# Patient Record
Sex: FEMALE | NOT HISPANIC OR LATINO | Employment: FULL TIME | URBAN - METROPOLITAN AREA
[De-identification: names, ages, dates, MRNs, and addresses within clinical notes are randomized per-mention and may not be internally consistent; named-entity substitution may affect disease eponyms.]

---
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
## 2020-02-05 ENCOUNTER — APPOINTMENT (OUTPATIENT)
Dept: RADIOLOGY | Facility: MEDICAL CENTER | Age: 20
DRG: 690 | End: 2020-02-05
Attending: EMERGENCY MEDICINE
Payer: COMMERCIAL

## 2020-02-05 ENCOUNTER — HOSPITAL ENCOUNTER (INPATIENT)
Facility: MEDICAL CENTER | Age: 20
LOS: 1 days | DRG: 690 | End: 2020-02-09
Attending: EMERGENCY MEDICINE | Admitting: HOSPITALIST
Payer: COMMERCIAL

## 2020-02-05 DIAGNOSIS — R11.2 INTRACTABLE VOMITING WITH NAUSEA, UNSPECIFIED VOMITING TYPE: ICD-10-CM

## 2020-02-05 DIAGNOSIS — N12 PYELONEPHRITIS: ICD-10-CM

## 2020-02-05 PROBLEM — D72.829 LEUKOCYTOSIS: Status: ACTIVE | Noted: 2020-02-05

## 2020-02-05 PROBLEM — N39.0 UTI (URINARY TRACT INFECTION): Status: ACTIVE | Noted: 2020-02-05

## 2020-02-05 PROBLEM — R73.9 HYPERGLYCEMIA: Status: ACTIVE | Noted: 2020-02-05

## 2020-02-05 LAB
ALBUMIN SERPL BCP-MCNC: 4.5 G/DL (ref 3.2–4.9)
ALBUMIN/GLOB SERPL: 1.1 G/DL
ALP SERPL-CCNC: 71 U/L (ref 30–99)
ALT SERPL-CCNC: 15 U/L (ref 2–50)
ANION GAP SERPL CALC-SCNC: 12 MMOL/L (ref 0–11.9)
APPEARANCE UR: ABNORMAL
AST SERPL-CCNC: 16 U/L (ref 12–45)
BACTERIA #/AREA URNS HPF: ABNORMAL /HPF
BASOPHILS # BLD AUTO: 0.4 % (ref 0–1.8)
BASOPHILS # BLD: 0.08 K/UL (ref 0–0.12)
BILIRUB SERPL-MCNC: 0.4 MG/DL (ref 0.1–1.5)
BILIRUB UR QL STRIP.AUTO: NEGATIVE
BUN SERPL-MCNC: 13 MG/DL (ref 8–22)
CALCIUM SERPL-MCNC: 9.9 MG/DL (ref 8.5–10.5)
CHLORIDE SERPL-SCNC: 104 MMOL/L (ref 96–112)
CO2 SERPL-SCNC: 21 MMOL/L (ref 20–33)
COLOR UR: YELLOW
CREAT SERPL-MCNC: 0.64 MG/DL (ref 0.5–1.4)
EOSINOPHIL # BLD AUTO: 0.03 K/UL (ref 0–0.51)
EOSINOPHIL NFR BLD: 0.2 % (ref 0–6.9)
EPI CELLS #/AREA URNS HPF: ABNORMAL /HPF
ERYTHROCYTE [DISTWIDTH] IN BLOOD BY AUTOMATED COUNT: 39 FL (ref 35.9–50)
GLOBULIN SER CALC-MCNC: 4.2 G/DL (ref 1.9–3.5)
GLUCOSE SERPL-MCNC: 191 MG/DL (ref 65–99)
GLUCOSE UR STRIP.AUTO-MCNC: 250 MG/DL
HCG SERPL QL: NEGATIVE
HCT VFR BLD AUTO: 44.2 % (ref 37–47)
HGB BLD-MCNC: 14.5 G/DL (ref 12–16)
HYALINE CASTS #/AREA URNS LPF: ABNORMAL /LPF
IMM GRANULOCYTES # BLD AUTO: 0.16 K/UL (ref 0–0.11)
IMM GRANULOCYTES NFR BLD AUTO: 0.8 % (ref 0–0.9)
KETONES UR STRIP.AUTO-MCNC: 80 MG/DL
LEUKOCYTE ESTERASE UR QL STRIP.AUTO: ABNORMAL
LIPASE SERPL-CCNC: 9 U/L (ref 11–82)
LYMPHOCYTES # BLD AUTO: 1.7 K/UL (ref 1–4.8)
LYMPHOCYTES NFR BLD: 9 % (ref 22–41)
MCH RBC QN AUTO: 27.1 PG (ref 27–33)
MCHC RBC AUTO-ENTMCNC: 32.8 G/DL (ref 33.6–35)
MCV RBC AUTO: 82.5 FL (ref 81.4–97.8)
MICRO URNS: ABNORMAL
MONOCYTES # BLD AUTO: 0.78 K/UL (ref 0–0.85)
MONOCYTES NFR BLD AUTO: 4.1 % (ref 0–13.4)
NEUTROPHILS # BLD AUTO: 16.09 K/UL (ref 2–7.15)
NEUTROPHILS NFR BLD: 85.5 % (ref 44–72)
NITRITE UR QL STRIP.AUTO: NEGATIVE
NRBC # BLD AUTO: 0 K/UL
NRBC BLD-RTO: 0 /100 WBC
PH UR STRIP.AUTO: 8 [PH] (ref 5–8)
PLATELET # BLD AUTO: 362 K/UL (ref 164–446)
PMV BLD AUTO: 10.1 FL (ref 9–12.9)
POTASSIUM SERPL-SCNC: 3.8 MMOL/L (ref 3.6–5.5)
PROT SERPL-MCNC: 8.7 G/DL (ref 6–8.2)
PROT UR QL STRIP: NEGATIVE MG/DL
RBC # BLD AUTO: 5.36 M/UL (ref 4.2–5.4)
RBC # URNS HPF: ABNORMAL /HPF
RBC UR QL AUTO: NEGATIVE
SODIUM SERPL-SCNC: 137 MMOL/L (ref 135–145)
SP GR UR STRIP.AUTO: 1.02
UROBILINOGEN UR STRIP.AUTO-MCNC: 1 MG/DL
WBC # BLD AUTO: 18.8 K/UL (ref 4.8–10.8)
WBC #/AREA URNS HPF: ABNORMAL /HPF

## 2020-02-05 PROCEDURE — 99285 EMERGENCY DEPT VISIT HI MDM: CPT

## 2020-02-05 PROCEDURE — 700117 HCHG RX CONTRAST REV CODE 255: Performed by: EMERGENCY MEDICINE

## 2020-02-05 PROCEDURE — 96365 THER/PROPH/DIAG IV INF INIT: CPT

## 2020-02-05 PROCEDURE — 99220 PR INITIAL OBSERVATION CARE,LEVL III: CPT | Performed by: HOSPITALIST

## 2020-02-05 PROCEDURE — 83690 ASSAY OF LIPASE: CPT

## 2020-02-05 PROCEDURE — 85025 COMPLETE CBC W/AUTO DIFF WBC: CPT

## 2020-02-05 PROCEDURE — 80307 DRUG TEST PRSMV CHEM ANLYZR: CPT

## 2020-02-05 PROCEDURE — 81001 URINALYSIS AUTO W/SCOPE: CPT

## 2020-02-05 PROCEDURE — 96375 TX/PRO/DX INJ NEW DRUG ADDON: CPT

## 2020-02-05 PROCEDURE — G0378 HOSPITAL OBSERVATION PER HR: HCPCS

## 2020-02-05 PROCEDURE — 700111 HCHG RX REV CODE 636 W/ 250 OVERRIDE (IP): Performed by: HOSPITALIST

## 2020-02-05 PROCEDURE — 84703 CHORIONIC GONADOTROPIN ASSAY: CPT

## 2020-02-05 PROCEDURE — 74177 CT ABD & PELVIS W/CONTRAST: CPT

## 2020-02-05 PROCEDURE — 700105 HCHG RX REV CODE 258: Performed by: HOSPITALIST

## 2020-02-05 PROCEDURE — 80053 COMPREHEN METABOLIC PANEL: CPT

## 2020-02-05 PROCEDURE — 87086 URINE CULTURE/COLONY COUNT: CPT

## 2020-02-05 PROCEDURE — 700105 HCHG RX REV CODE 258: Performed by: EMERGENCY MEDICINE

## 2020-02-05 PROCEDURE — 96376 TX/PRO/DX INJ SAME DRUG ADON: CPT

## 2020-02-05 PROCEDURE — 700111 HCHG RX REV CODE 636 W/ 250 OVERRIDE (IP): Performed by: EMERGENCY MEDICINE

## 2020-02-05 RX ORDER — SODIUM CHLORIDE 9 MG/ML
1000 INJECTION, SOLUTION INTRAVENOUS ONCE
Status: COMPLETED | OUTPATIENT
Start: 2020-02-05 | End: 2020-02-05

## 2020-02-05 RX ORDER — DIPHENHYDRAMINE HYDROCHLORIDE 50 MG/ML
25 INJECTION INTRAMUSCULAR; INTRAVENOUS ONCE
Status: COMPLETED | OUTPATIENT
Start: 2020-02-05 | End: 2020-02-05

## 2020-02-05 RX ORDER — PROMETHAZINE HYDROCHLORIDE 12.5 MG/1
12.5-25 SUPPOSITORY RECTAL EVERY 4 HOURS PRN
Status: DISCONTINUED | OUTPATIENT
Start: 2020-02-05 | End: 2020-02-09 | Stop reason: HOSPADM

## 2020-02-05 RX ORDER — PROMETHAZINE HYDROCHLORIDE 25 MG/1
12.5-25 TABLET ORAL EVERY 4 HOURS PRN
Status: DISCONTINUED | OUTPATIENT
Start: 2020-02-05 | End: 2020-02-09 | Stop reason: HOSPADM

## 2020-02-05 RX ORDER — ACETAMINOPHEN 325 MG/1
650 TABLET ORAL EVERY 6 HOURS PRN
Status: DISCONTINUED | OUTPATIENT
Start: 2020-02-05 | End: 2020-02-09 | Stop reason: HOSPADM

## 2020-02-05 RX ORDER — ONDANSETRON 2 MG/ML
4 INJECTION INTRAMUSCULAR; INTRAVENOUS EVERY 4 HOURS PRN
Status: DISCONTINUED | OUTPATIENT
Start: 2020-02-05 | End: 2020-02-09 | Stop reason: HOSPADM

## 2020-02-05 RX ORDER — PROCHLORPERAZINE EDISYLATE 5 MG/ML
5-10 INJECTION INTRAMUSCULAR; INTRAVENOUS EVERY 4 HOURS PRN
Status: DISCONTINUED | OUTPATIENT
Start: 2020-02-05 | End: 2020-02-09 | Stop reason: HOSPADM

## 2020-02-05 RX ORDER — ONDANSETRON 4 MG/1
4 TABLET, ORALLY DISINTEGRATING ORAL EVERY 4 HOURS PRN
Status: DISCONTINUED | OUTPATIENT
Start: 2020-02-05 | End: 2020-02-09 | Stop reason: HOSPADM

## 2020-02-05 RX ORDER — SODIUM CHLORIDE 9 MG/ML
INJECTION, SOLUTION INTRAVENOUS CONTINUOUS
Status: DISCONTINUED | OUTPATIENT
Start: 2020-02-05 | End: 2020-02-06

## 2020-02-05 RX ORDER — POLYETHYLENE GLYCOL 3350 17 G/17G
1 POWDER, FOR SOLUTION ORAL
Status: DISCONTINUED | OUTPATIENT
Start: 2020-02-05 | End: 2020-02-09 | Stop reason: HOSPADM

## 2020-02-05 RX ORDER — BISACODYL 10 MG
10 SUPPOSITORY, RECTAL RECTAL
Status: DISCONTINUED | OUTPATIENT
Start: 2020-02-05 | End: 2020-02-09 | Stop reason: HOSPADM

## 2020-02-05 RX ORDER — AMOXICILLIN 250 MG
2 CAPSULE ORAL 2 TIMES DAILY
Status: DISCONTINUED | OUTPATIENT
Start: 2020-02-06 | End: 2020-02-09 | Stop reason: HOSPADM

## 2020-02-05 RX ORDER — ONDANSETRON 2 MG/ML
4 INJECTION INTRAMUSCULAR; INTRAVENOUS ONCE
Status: COMPLETED | OUTPATIENT
Start: 2020-02-05 | End: 2020-02-05

## 2020-02-05 RX ORDER — PROCHLORPERAZINE EDISYLATE 5 MG/ML
10 INJECTION INTRAMUSCULAR; INTRAVENOUS ONCE
Status: COMPLETED | OUTPATIENT
Start: 2020-02-05 | End: 2020-02-05

## 2020-02-05 RX ADMIN — SODIUM CHLORIDE: 9 INJECTION, SOLUTION INTRAVENOUS at 15:00

## 2020-02-05 RX ADMIN — PROCHLORPERAZINE EDISYLATE 10 MG: 5 INJECTION INTRAMUSCULAR; INTRAVENOUS at 20:43

## 2020-02-05 RX ADMIN — SODIUM CHLORIDE 1000 ML: 9 INJECTION, SOLUTION INTRAVENOUS at 09:39

## 2020-02-05 RX ADMIN — IOHEXOL 100 ML: 350 INJECTION, SOLUTION INTRAVENOUS at 12:36

## 2020-02-05 RX ADMIN — CEFTRIAXONE SODIUM 2 G: 2 INJECTION, POWDER, FOR SOLUTION INTRAMUSCULAR; INTRAVENOUS at 13:41

## 2020-02-05 RX ADMIN — ONDANSETRON 4 MG: 2 INJECTION INTRAMUSCULAR; INTRAVENOUS at 13:56

## 2020-02-05 RX ADMIN — PROCHLORPERAZINE EDISYLATE 10 MG: 5 INJECTION INTRAMUSCULAR; INTRAVENOUS at 10:45

## 2020-02-05 RX ADMIN — ONDANSETRON 4 MG: 2 INJECTION INTRAMUSCULAR; INTRAVENOUS at 09:39

## 2020-02-05 RX ADMIN — DIPHENHYDRAMINE HYDROCHLORIDE 25 MG: 50 INJECTION INTRAMUSCULAR; INTRAVENOUS at 10:46

## 2020-02-05 RX ADMIN — ONDANSETRON 4 MG: 2 INJECTION INTRAMUSCULAR; INTRAVENOUS at 19:13

## 2020-02-05 ASSESSMENT — LIFESTYLE VARIABLES
TOTAL SCORE: 0
HAVE YOU EVER FELT YOU SHOULD CUT DOWN ON YOUR DRINKING: NO
HOW MANY TIMES IN THE PAST YEAR HAVE YOU HAD 5 OR MORE DRINKS IN A DAY: 0
EVER_SMOKED: NEVER
EVER FELT BAD OR GUILTY ABOUT YOUR DRINKING: NO
HAVE PEOPLE ANNOYED YOU BY CRITICIZING YOUR DRINKING: NO
CONSUMPTION TOTAL: NEGATIVE
SUBSTANCE_ABUSE: 0
DOES PATIENT WANT TO STOP DRINKING: NO
ON A TYPICAL DAY WHEN YOU DRINK ALCOHOL HOW MANY DRINKS DO YOU HAVE: 0
EVER_SMOKED: NEVER
AVERAGE NUMBER OF DAYS PER WEEK YOU HAVE A DRINK CONTAINING ALCOHOL: 0
ALCOHOL_USE: NO
EVER HAD A DRINK FIRST THING IN THE MORNING TO STEADY YOUR NERVES TO GET RID OF A HANGOVER: NO

## 2020-02-05 ASSESSMENT — ENCOUNTER SYMPTOMS
VOMITING: 1
BRUISES/BLEEDS EASILY: 0
BACK PAIN: 0
SINUS PAIN: 0
DIARRHEA: 0
HEADACHES: 0
FEVER: 0
CHILLS: 0
NAUSEA: 1
COUGH: 0
WEIGHT LOSS: 0
BLURRED VISION: 0
DOUBLE VISION: 0
CONSTIPATION: 0
CLAUDICATION: 0
HEMOPTYSIS: 0
NECK PAIN: 0
FLANK PAIN: 0
DIZZINESS: 0
ORTHOPNEA: 0
DEPRESSION: 0
HEARTBURN: 0
TREMORS: 0
MYALGIAS: 0
SPUTUM PRODUCTION: 0
TINGLING: 0
ABDOMINAL PAIN: 1
PALPITATIONS: 0

## 2020-02-05 ASSESSMENT — PATIENT HEALTH QUESTIONNAIRE - PHQ9
SUM OF ALL RESPONSES TO PHQ9 QUESTIONS 1 AND 2: 0
SUM OF ALL RESPONSES TO PHQ9 QUESTIONS 1 AND 2: 0
1. LITTLE INTEREST OR PLEASURE IN DOING THINGS: NOT AT ALL
2. FEELING DOWN, DEPRESSED, IRRITABLE, OR HOPELESS: NOT AT ALL
1. LITTLE INTEREST OR PLEASURE IN DOING THINGS: NOT AT ALL
2. FEELING DOWN, DEPRESSED, IRRITABLE, OR HOPELESS: NOT AT ALL
1. LITTLE INTEREST OR PLEASURE IN DOING THINGS: NOT AT ALL
SUM OF ALL RESPONSES TO PHQ9 QUESTIONS 1 AND 2: 0

## 2020-02-05 ASSESSMENT — PAIN SCALES - WONG BAKER: WONGBAKER_NUMERICALRESPONSE: DOESN'T HURT AT ALL

## 2020-02-05 ASSESSMENT — COPD QUESTIONNAIRES
COPD SCREENING SCORE: 0
HAVE YOU SMOKED AT LEAST 100 CIGARETTES IN YOUR ENTIRE LIFE: NO/DON'T KNOW
DO YOU EVER COUGH UP ANY MUCUS OR PHLEGM?: NO/ONLY WITH OCCASIONAL COLDS OR INFECTIONS
DURING THE PAST 4 WEEKS HOW MUCH DID YOU FEEL SHORT OF BREATH: NONE/LITTLE OF THE TIME

## 2020-02-05 NOTE — ED TRIAGE NOTES
Chief Complaint   Patient presents with   • N/V     started around 2am. Fevers last night. denies pregnancy. pt states she ate rice and egg last night.

## 2020-02-05 NOTE — H&P
Hospital Medicine History & Physical Note    Date of Service  2/5/2020    Primary Care Physician  Pcp Pt States None    Consultants  None    Code Status  Full code    Chief Complaint  Nausea vomiting    History of Presenting Illness  19 y.o. female who presented 2/5/2020 with no significant past medical history is coming today complaining of increasing nausea and vomiting started today, patient denies any fever chills nausea vomiting, no chest pain, has mild abdominal pain, feels weak, no shortness of breath, no cough, no recent travel, no ill contacts, father is at bedside and helped with information, patient denies dysuria, hematuria, has frequency, denies any low back pain, patient continue having vomiting in the emergency room, she was started on IV fluids, antinausea medication, CT abdomen did not show any acute findings, pregnancy test is negative, patient UA showed UTI, she is started on IV antibiotics, patient is going to be admitted, will follow-up urine and blood cultures, continue supportive treatment, I have discussed case and plan of care with patient and patient's father, all questions have been answered.    Review of Systems  Review of Systems   Constitutional: Positive for malaise/fatigue. Negative for chills, fever and weight loss.   HENT: Negative for congestion, nosebleeds and sinus pain.    Eyes: Negative for blurred vision and double vision.   Respiratory: Negative for cough, hemoptysis and sputum production.    Cardiovascular: Negative for chest pain, palpitations, orthopnea and claudication.   Gastrointestinal: Positive for abdominal pain, nausea and vomiting. Negative for constipation, diarrhea and heartburn.   Genitourinary: Positive for frequency. Negative for dysuria, flank pain, hematuria and urgency.   Musculoskeletal: Negative for back pain, myalgias and neck pain.   Skin: Negative for itching and rash.   Neurological: Negative for dizziness, tingling, tremors and headaches.    Endo/Heme/Allergies: Does not bruise/bleed easily.   Psychiatric/Behavioral: Negative for depression, substance abuse and suicidal ideas.       Past Medical History  No significant past medical history    Surgical History  No recent surgeries    Family History  Denies history of diabetes    Social History   reports that she has never smoked. She has never used smokeless tobacco. She reports previous alcohol use. She reports previous drug use.    Allergies  No Known Allergies    Medications  No home medications       Physical Exam  Temp:  [36 °C (96.8 °F)-36.2 °C (97.2 °F)] 36.2 °C (97.2 °F)  Pulse:  [71-97] 73  Resp:  [18-24] 18  BP: (104-117)/(61-76) 117/72  SpO2:  [95 %-100 %] 97 %    Physical Exam  Vitals signs and nursing note reviewed.   Constitutional:       General: She is not in acute distress.     Appearance: Normal appearance.   HENT:      Head: Normocephalic and atraumatic.      Nose: Nose normal.      Mouth/Throat:      Pharynx: Oropharynx is clear.   Eyes:      General: No scleral icterus.        Right eye: No discharge.         Left eye: No discharge.      Extraocular Movements: Extraocular movements intact.      Conjunctiva/sclera: Conjunctivae normal.      Pupils: Pupils are equal, round, and reactive to light.   Neck:      Musculoskeletal: Normal range of motion and neck supple. No neck rigidity or muscular tenderness.   Cardiovascular:      Rate and Rhythm: Normal rate and regular rhythm.      Pulses: Normal pulses.   Pulmonary:      Effort: Pulmonary effort is normal. No respiratory distress.      Breath sounds: Normal breath sounds. No wheezing or rhonchi.   Abdominal:      General: Bowel sounds are normal. There is no distension.      Palpations: Abdomen is soft.      Tenderness: There is no tenderness. There is no guarding.   Musculoskeletal: Normal range of motion.         General: No swelling or tenderness.   Skin:     General: Skin is warm and dry.      Capillary Refill: Capillary refill  takes less than 2 seconds.   Neurological:      General: No focal deficit present.      Mental Status: She is alert and oriented to person, place, and time.      Cranial Nerves: No cranial nerve deficit.   Psychiatric:         Mood and Affect: Mood normal.         Laboratory:  Recent Labs     02/05/20  0937   WBC 18.8*   RBC 5.36   HEMOGLOBIN 14.5   HEMATOCRIT 44.2   MCV 82.5   MCH 27.1   MCHC 32.8*   RDW 39.0   PLATELETCT 362   MPV 10.1     Recent Labs     02/05/20  0937   SODIUM 137   POTASSIUM 3.8   CHLORIDE 104   CO2 21   GLUCOSE 191*   BUN 13   CREATININE 0.64   CALCIUM 9.9     Recent Labs     02/05/20  0937   ALTSGPT 15   ASTSGOT 16   ALKPHOSPHAT 71   TBILIRUBIN 0.4   LIPASE 9*   GLUCOSE 191*         No results for input(s): NTPROBNP in the last 72 hours.      No results for input(s): TROPONINT in the last 72 hours.    Urinalysis:    Recent Labs     02/05/20  1240   SPECGRAVITY 1.023   GLUCOSEUR 250*   KETONES 80*   NITRITE Negative   LEUKESTERAS Large*   WBCURINE *   RBCURINE 2-5*   BACTERIA Few*   EPITHELCELL Few        Imaging:  CT-ABDOMEN-PELVIS WITH   Final Result      Stable 9 cm pelvic teratoma      Nonvisualization of the appendix      Resolved right nephrolith            Assessment/Plan:  I anticipate this patient is appropriate for observation status at this time.    Hyperglycemia- (present on admission)  Assessment & Plan  Will check A1c, patient has elevated anion gap but bicarb is 21, her blood sugar is 191, probably due to ketoacidosis from starvation.    Leukocytosis- (present on admission)  Assessment & Plan  Probably due to UTI and reactive to nausea and vomiting, continue monitoring.    UTI (urinary tract infection)- (present on admission)  Assessment & Plan  Started on IV antibiotics, continue gentle IV fluids, follow-up urine and blood cultures.    Intractable vomiting with nausea- (present on admission)  Assessment & Plan  Probably due to UTI, started on supportive treatment,  antinausea medication, IV fluids.      VTE prophylaxis: SCDs

## 2020-02-05 NOTE — ED NOTES
Med Rec Updated and Complete per Pt and family at bedside  Allergies Reviewed  No PO ABX last 14 days.    Pt and family denies RX or OTC medication at this time.

## 2020-02-05 NOTE — ED PROVIDER NOTES
"ED Provider Note    Scribed for Yung Vizcaino M.D. by Marcela Mcallister. 2/5/2020  9:34 AM    Primary care provider: Pcp Pt States None  Means of arrival: Private Vehicle  History obtained from: Patient  History limited by: None    CHIEF COMPLAINT  Chief Complaint   Patient presents with   • N/V     started around 2am. Fevers last night. denies pregnancy. pt states she ate rice and egg last night.        HPI  Julianna Johnson is a 19 y.o. female who presents to the Emergency Department with her family for vomiting onset 0200 this morning. Her family reports she was nauseous last night before going to bed. The color of her emesis has changed, and now appears bloody. She denies diarrhea or chance of pregnancy. There are no exacerbating or alleviating factors reported.     REVIEW OF SYSTEMS  Pertinent positives include nausea, vomiting.   Pertinent negatives include no diarrhea.    All other systems reviewed and negative. See HPI for further details.       PAST MEDICAL HISTORY  None noted    SURGICAL HISTORY  patient denies any surgical history    SOCIAL HISTORY  Social History     Tobacco Use   • Smoking status: Never Smoker   • Smokeless tobacco: Never Used   Substance Use Topics   • Alcohol use: Not Currently   • Drug use: Not Currently      Social History     Substance and Sexual Activity   Drug Use Not Currently       FAMILY HISTORY  Not pertinent    CURRENT MEDICATIONS  Current Outpatient Medications:   •  ondansetron (ZOFRAN ODT) 4 MG TABLET DISPERSIBLE, Take 1 Tab by mouth every 6 hours as needed for Nausea., Disp: 10 Tab, Rfl: 0    ALLERGIES  No Known Allergies    PHYSICAL EXAM  VITAL SIGNS: /76   Pulse 97   Temp 36 °C (96.8 °F) (Temporal)   Resp (!) 24   Ht 1.702 m (5' 7\")   Wt 89.8 kg (198 lb)   LMP 01/15/2020 (Exact Date)   SpO2 98%   BMI 31.01 kg/m²     Nursing note and vitals reviewed.  Constitutional: Well-developed and well-nourished. Moderate distress. Appears uncomfortable.   HENT: " Head is normocephalic and atraumatic. Oropharynx is clear and moist without exudate or erythema.   Eyes: Pupils are equal, round, and reactive to light. Conjunctiva are normal.   Cardiovascular: Normal rate and regular rhythm. No murmur heard. Normal radial pulses.  Pulmonary/Chest: Breath sounds normal. No wheezes or rales.   Abdominal: Unable to elicit any abdominal tenderness. Retching on exam. Soft. No distention    Musculoskeletal: Extremities exhibit normal range of motion without edema or tenderness.   Neurological: Awake, alert and oriented to person, place, and time. No focal deficits noted.  Skin: Skin is warm and dry. No rash.   Psychiatric: Normal mood and affect. Appropriate for clinical situation.    DIAGNOSTIC STUDIES / PROCEDURES    LABS  Results for orders placed or performed during the hospital encounter of 02/05/20   CBC WITH DIFFERENTIAL   Result Value Ref Range    WBC 18.8 (H) 4.8 - 10.8 K/uL    RBC 5.36 4.20 - 5.40 M/uL    Hemoglobin 14.5 12.0 - 16.0 g/dL    Hematocrit 44.2 37.0 - 47.0 %    MCV 82.5 81.4 - 97.8 fL    MCH 27.1 27.0 - 33.0 pg    MCHC 32.8 (L) 33.6 - 35.0 g/dL    RDW 39.0 35.9 - 50.0 fL    Platelet Count 362 164 - 446 K/uL    MPV 10.1 9.0 - 12.9 fL    Neutrophils-Polys 85.50 (H) 44.00 - 72.00 %    Lymphocytes 9.00 (L) 22.00 - 41.00 %    Monocytes 4.10 0.00 - 13.40 %    Eosinophils 0.20 0.00 - 6.90 %    Basophils 0.40 0.00 - 1.80 %    Immature Granulocytes 0.80 0.00 - 0.90 %    Nucleated RBC 0.00 /100 WBC    Neutrophils (Absolute) 16.09 (H) 2.00 - 7.15 K/uL    Lymphs (Absolute) 1.70 1.00 - 4.80 K/uL    Monos (Absolute) 0.78 0.00 - 0.85 K/uL    Eos (Absolute) 0.03 0.00 - 0.51 K/uL    Baso (Absolute) 0.08 0.00 - 0.12 K/uL    Immature Granulocytes (abs) 0.16 (H) 0.00 - 0.11 K/uL    NRBC (Absolute) 0.00 K/uL   COMP METABOLIC PANEL   Result Value Ref Range    Sodium 137 135 - 145 mmol/L    Potassium 3.8 3.6 - 5.5 mmol/L    Chloride 104 96 - 112 mmol/L    Co2 21 20 - 33 mmol/L    Anion  Gap 12.0 (H) 0.0 - 11.9    Glucose 191 (H) 65 - 99 mg/dL    Bun 13 8 - 22 mg/dL    Creatinine 0.64 0.50 - 1.40 mg/dL    Calcium 9.9 8.5 - 10.5 mg/dL    AST(SGOT) 16 12 - 45 U/L    ALT(SGPT) 15 2 - 50 U/L    Alkaline Phosphatase 71 30 - 99 U/L    Total Bilirubin 0.4 0.1 - 1.5 mg/dL    Albumin 4.5 3.2 - 4.9 g/dL    Total Protein 8.7 (H) 6.0 - 8.2 g/dL    Globulin 4.2 (H) 1.9 - 3.5 g/dL    A-G Ratio 1.1 g/dL   LIPASE   Result Value Ref Range    Lipase 9 (L) 11 - 82 U/L   HCG QUAL SERUM   Result Value Ref Range    Beta-Hcg Qualitative Serum Negative Negative   URINALYSIS,CULTURE IF INDICATED   Result Value Ref Range    Color Yellow     Character Cloudy (A)     Specific Gravity 1.023 <1.035    Ph 8.0 5.0 - 8.0    Glucose 250 (A) Negative mg/dL    Ketones 80 (A) Negative mg/dL    Protein Negative Negative mg/dL    Bilirubin Negative Negative    Urobilinogen, Urine 1.0 Negative    Nitrite Negative Negative    Leukocyte Esterase Large (A) Negative    Occult Blood Negative Negative    Micro Urine Req Microscopic    ESTIMATED GFR   Result Value Ref Range    GFR If African American >60 >60 mL/min/1.73 m 2    GFR If Non African American >60 >60 mL/min/1.73 m 2   URINE MICROSCOPIC (W/UA)   Result Value Ref Range    WBC  (A) /hpf    RBC 2-5 (A) /hpf    Bacteria Few (A) None /hpf    Epithelial Cells Few /hpf    Hyaline Cast 0-2 /lpf     All labs reviewed by me.    RADIOLOGY  CT-ABDOMEN-PELVIS WITH   Final Result      Stable 9 cm pelvic teratoma      Nonvisualization of the appendix      Resolved right nephrolith        Interpreted by the radiologist and reviewed by me.    COURSE & MEDICAL DECISION MAKING  Nursing notes, JOSE PMSFHx reviewed in chart.       9:34 AM - Patient seen and examined at bedside. I explained the presence of blood in her vomit is likely from her esophagus being raw from the amount of times she's vomited and her continued dry-heaving. Patient will be treated with Zofran 4mg and NS infusion, 1,000 mL.  Intravenous fluids were administered for acute vomiting.  Ordered CBC w/ differential, CMP, Lipase, HCG Qual, and UA to evaluate her symptoms.     10:42 AM - I reevaluated the patient at bedside, and she is till vomiting. I have ordered Compazine 10mg and Benadryl 25 mg.     11:01 AM - Review of laboratory results reveal elevated WBC. I have ordered a CT-Abdomen-Pelvis w/ contrast to evaluate further.      1:06 PM - Patinet's CT is negative. Her UA suggests infection. Patient's vomiting is likely due to pyelonephritis.     1:09 PM - I reevaluated the patient at bedside and updated her on her evaluation results, as shown above. I discussed the plan for discharge, as outlined below. The patient understands and agrees.     1:13 PM - Paged Hospitalist    HYDRATION: Based on the patient's presentation of Acute Vomiting the patient was given IV fluids. IV Hydration was used because oral hydration was not adequate alone. Upon recheck following hydration, the patient was improved.    DISPOSITION:  Patient will be hospitalized by Dr. Armendariz in stable condition.    FINAL IMPRESSION  1. Intractable vomiting with nausea, unspecified vomiting type    2. Pyelonephritis          Marcela ALBERTO (Scribe), am scribing for, and in the presence of, Yung Vizcaino M.D..    Electronically signed by: Marcela Mcallister (Scribsubhash), 2/5/2020    Yung ALBERTO M.D. personally performed the services described in this documentation, as scribed by Marcela Mcallister in my presence, and it is both accurate and complete.    The note accurately reflects work and decisions made by me.  Yung Vizcaino M.D.  2/5/2020  2:58 PM

## 2020-02-06 LAB
ALBUMIN SERPL BCP-MCNC: 4.3 G/DL (ref 3.2–4.9)
ALBUMIN/GLOB SERPL: 1.2 G/DL
ALP SERPL-CCNC: 61 U/L (ref 30–99)
ALT SERPL-CCNC: 10 U/L (ref 2–50)
AMPHET UR QL SCN: NEGATIVE
ANION GAP SERPL CALC-SCNC: 12 MMOL/L (ref 0–11.9)
AST SERPL-CCNC: 12 U/L (ref 12–45)
BARBITURATES UR QL SCN: NEGATIVE
BENZODIAZ UR QL SCN: NEGATIVE
BILIRUB SERPL-MCNC: 0.5 MG/DL (ref 0.1–1.5)
BUN SERPL-MCNC: 14 MG/DL (ref 8–22)
BZE UR QL SCN: NEGATIVE
CALCIUM SERPL-MCNC: 8.8 MG/DL (ref 8.5–10.5)
CANNABINOIDS UR QL SCN: NEGATIVE
CHLORIDE SERPL-SCNC: 107 MMOL/L (ref 96–112)
CO2 SERPL-SCNC: 22 MMOL/L (ref 20–33)
CREAT SERPL-MCNC: 0.5 MG/DL (ref 0.5–1.4)
ERYTHROCYTE [DISTWIDTH] IN BLOOD BY AUTOMATED COUNT: 40.4 FL (ref 35.9–50)
EST. AVERAGE GLUCOSE BLD GHB EST-MCNC: 123 MG/DL
GLOBULIN SER CALC-MCNC: 3.7 G/DL (ref 1.9–3.5)
GLUCOSE SERPL-MCNC: 124 MG/DL (ref 65–99)
HBA1C MFR BLD: 5.9 % (ref 0–5.6)
HCT VFR BLD AUTO: 40 % (ref 37–47)
HGB BLD-MCNC: 13.3 G/DL (ref 12–16)
MCH RBC QN AUTO: 27.5 PG (ref 27–33)
MCHC RBC AUTO-ENTMCNC: 33.3 G/DL (ref 33.6–35)
MCV RBC AUTO: 82.8 FL (ref 81.4–97.8)
METHADONE UR QL SCN: NEGATIVE
OPIATES UR QL SCN: NEGATIVE
OXYCODONE UR QL SCN: NEGATIVE
PCP UR QL SCN: NEGATIVE
PLATELET # BLD AUTO: 327 K/UL (ref 164–446)
PMV BLD AUTO: 9.6 FL (ref 9–12.9)
POTASSIUM SERPL-SCNC: 3.5 MMOL/L (ref 3.6–5.5)
PROPOXYPH UR QL SCN: NEGATIVE
PROT SERPL-MCNC: 8 G/DL (ref 6–8.2)
RBC # BLD AUTO: 4.83 M/UL (ref 4.2–5.4)
SODIUM SERPL-SCNC: 141 MMOL/L (ref 135–145)
WBC # BLD AUTO: 17 K/UL (ref 4.8–10.8)

## 2020-02-06 PROCEDURE — 85027 COMPLETE CBC AUTOMATED: CPT

## 2020-02-06 PROCEDURE — 700105 HCHG RX REV CODE 258: Performed by: NURSE PRACTITIONER

## 2020-02-06 PROCEDURE — 36415 COLL VENOUS BLD VENIPUNCTURE: CPT

## 2020-02-06 PROCEDURE — 96366 THER/PROPH/DIAG IV INF ADDON: CPT

## 2020-02-06 PROCEDURE — 99226 PR SUBSEQUENT OBSERVATION CARE,LEVEL III: CPT | Performed by: INTERNAL MEDICINE

## 2020-02-06 PROCEDURE — 700105 HCHG RX REV CODE 258: Performed by: HOSPITALIST

## 2020-02-06 PROCEDURE — 700111 HCHG RX REV CODE 636 W/ 250 OVERRIDE (IP): Performed by: NURSE PRACTITIONER

## 2020-02-06 PROCEDURE — 83036 HEMOGLOBIN GLYCOSYLATED A1C: CPT

## 2020-02-06 PROCEDURE — 700111 HCHG RX REV CODE 636 W/ 250 OVERRIDE (IP): Performed by: HOSPITALIST

## 2020-02-06 PROCEDURE — 80053 COMPREHEN METABOLIC PANEL: CPT

## 2020-02-06 PROCEDURE — G0378 HOSPITAL OBSERVATION PER HR: HCPCS

## 2020-02-06 PROCEDURE — 96376 TX/PRO/DX INJ SAME DRUG ADON: CPT

## 2020-02-06 RX ORDER — POTASSIUM CHLORIDE 20 MEQ/1
40 TABLET, EXTENDED RELEASE ORAL ONCE
Status: DISCONTINUED | OUTPATIENT
Start: 2020-02-06 | End: 2020-02-06

## 2020-02-06 RX ORDER — DIPHENHYDRAMINE HCL 25 MG
12.5 TABLET ORAL ONCE
Status: COMPLETED | OUTPATIENT
Start: 2020-02-06 | End: 2020-02-07

## 2020-02-06 RX ADMIN — SODIUM CHLORIDE: 9 INJECTION, SOLUTION INTRAVENOUS at 04:15

## 2020-02-06 RX ADMIN — ONDANSETRON 4 MG: 2 INJECTION INTRAMUSCULAR; INTRAVENOUS at 22:01

## 2020-02-06 RX ADMIN — CEFTRIAXONE SODIUM 2 G: 2 INJECTION, POWDER, FOR SOLUTION INTRAMUSCULAR; INTRAVENOUS at 06:20

## 2020-02-06 RX ADMIN — POTASSIUM CHLORIDE: 149 INJECTION, SOLUTION, CONCENTRATE INTRAVENOUS at 18:01

## 2020-02-06 RX ADMIN — POTASSIUM CHLORIDE: 149 INJECTION, SOLUTION, CONCENTRATE INTRAVENOUS at 08:30

## 2020-02-06 RX ADMIN — PROCHLORPERAZINE EDISYLATE 10 MG: 5 INJECTION INTRAMUSCULAR; INTRAVENOUS at 23:02

## 2020-02-06 RX ADMIN — ONDANSETRON 4 MG: 2 INJECTION INTRAMUSCULAR; INTRAVENOUS at 06:19

## 2020-02-06 RX ADMIN — PROCHLORPERAZINE EDISYLATE 10 MG: 5 INJECTION INTRAMUSCULAR; INTRAVENOUS at 00:49

## 2020-02-06 ASSESSMENT — PATIENT HEALTH QUESTIONNAIRE - PHQ9
2. FEELING DOWN, DEPRESSED, IRRITABLE, OR HOPELESS: NOT AT ALL
SUM OF ALL RESPONSES TO PHQ9 QUESTIONS 1 AND 2: 0
1. LITTLE INTEREST OR PLEASURE IN DOING THINGS: NOT AT ALL

## 2020-02-06 ASSESSMENT — ENCOUNTER SYMPTOMS
ABDOMINAL PAIN: 0
DIARRHEA: 0
MYALGIAS: 0
PALPITATIONS: 0
FEVER: 0
COUGH: 0
BLURRED VISION: 0
DOUBLE VISION: 0
DIZZINESS: 0
NAUSEA: 1
HEADACHES: 0
SHORTNESS OF BREATH: 0
CHILLS: 0
VOMITING: 0

## 2020-02-06 ASSESSMENT — PAIN SCALES - WONG BAKER
WONGBAKER_NUMERICALRESPONSE: DOESN'T HURT AT ALL
WONGBAKER_NUMERICALRESPONSE: DOESN'T HURT AT ALL

## 2020-02-06 NOTE — PROGRESS NOTES
[facility] Medicine Daily Progress Note    Date of Service  2/6/2020    Chief Complaint  19 y.o. female admitted 2/5/2020 with intractable nausea vomiting.    Hospital Course    This is a 19-year-old female with no significant past medical history admitted with intractable nausea and vomiting.  Suspect viral gastroenteritis.      Interval Problem Update  2/6: Vomiting overall improved.  Continues to have nausea.  Continue IV hydration and antiemetics.  Denies abdominal pain.  Denies diarrhea.  Afebrile.  Vital signs stable.  Noted to have hyperglycemia.  Hemoglobin A1c pending.    Consultants/Specialty  N/A    Code Status  FULL    Disposition  Anticipate home when symptoms improved.    Review of Systems  Review of Systems   Constitutional: Positive for malaise/fatigue. Negative for chills and fever.   HENT: Negative for congestion.    Eyes: Negative for blurred vision and double vision.   Respiratory: Negative for cough and shortness of breath.    Cardiovascular: Negative for chest pain, palpitations and leg swelling.   Gastrointestinal: Positive for nausea. Negative for abdominal pain, diarrhea and vomiting.   Genitourinary: Negative for dysuria.   Musculoskeletal: Negative for myalgias.   Skin: Negative for itching and rash.   Neurological: Negative for dizziness and headaches.        Physical Exam  Temp:  [36.2 °C (97.2 °F)-37.6 °C (99.7 °F)] 37.1 °C (98.7 °F)  Pulse:  [67-98] 82  Resp:  [16-18] 16  BP: (112-143)/(59-78) 113/65  SpO2:  [94 %-98 %] 95 %    Physical Exam  Vitals signs and nursing note reviewed.   Constitutional:       General: She is not in acute distress.     Appearance: Normal appearance. She is ill-appearing.   HENT:      Head: Normocephalic and atraumatic.      Nose: Nose normal. No congestion.      Mouth/Throat:      Mouth: Mucous membranes are dry.      Pharynx: Oropharynx is clear.   Eyes:      General:         Right eye: No discharge.         Left eye: No discharge.      Conjunctiva/sclera:  Conjunctivae normal.   Neck:      Musculoskeletal: Normal range of motion. No neck rigidity or muscular tenderness.   Cardiovascular:      Rate and Rhythm: Normal rate and regular rhythm.      Heart sounds: Normal heart sounds. No murmur.   Pulmonary:      Effort: Pulmonary effort is normal. No respiratory distress.      Breath sounds: Normal breath sounds. No wheezing or rales.   Abdominal:      General: Bowel sounds are normal. There is no distension.      Palpations: Abdomen is soft.      Tenderness: There is no tenderness. There is no guarding.   Musculoskeletal: Normal range of motion.         General: No swelling or tenderness.   Skin:     General: Skin is warm and dry.      Coloration: Skin is not jaundiced.   Neurological:      General: No focal deficit present.      Mental Status: She is alert and oriented to person, place, and time. Mental status is at baseline.   Psychiatric:         Mood and Affect: Mood normal.         Behavior: Behavior normal.         Thought Content: Thought content normal.         Judgment: Judgment normal.         Fluids    Intake/Output Summary (Last 24 hours) at 2/6/2020 1303  Last data filed at 2/6/2020 0625  Gross per 24 hour   Intake 100 ml   Output 850 ml   Net -750 ml       Laboratory  Recent Labs     02/05/20  0937 02/06/20  0432   WBC 18.8* 17.0*   RBC 5.36 4.83   HEMOGLOBIN 14.5 13.3   HEMATOCRIT 44.2 40.0   MCV 82.5 82.8   MCH 27.1 27.5   MCHC 32.8* 33.3*   RDW 39.0 40.4   PLATELETCT 362 327   MPV 10.1 9.6     Recent Labs     02/05/20  0937 02/06/20  0432   SODIUM 137 141   POTASSIUM 3.8 3.5*   CHLORIDE 104 107   CO2 21 22   GLUCOSE 191* 124*   BUN 13 14   CREATININE 0.64 0.50   CALCIUM 9.9 8.8                   Imaging  CT-ABDOMEN-PELVIS WITH   Final Result      Stable 9 cm pelvic teratoma      Nonvisualization of the appendix      Resolved right nephrolith           Assessment/Plan  Hyperglycemia- (present on admission)  Assessment & Plan  A1c pending.    Leukocytosis-  (present on admission)  Assessment & Plan  Probably due to UTI and reactive to nausea and vomiting, continue monitoring.    UTI (urinary tract infection)- (present on admission)  Assessment & Plan  Continue ceftriaxone.  Follow urine culture.    Intractable vomiting with nausea- (present on admission)  Assessment & Plan  Suspect viral gastroenteritis.  Vomiting overall improved.  Continue IV fluid and supportive care.  Advance diet as tolerated.         VTE prophylaxis: Ambulatory,

## 2020-02-06 NOTE — CARE PLAN
Problem: Knowledge Deficit  Goal: Patient/Significant other demonstrates understanding of disease process, treatment plan, medications and discharge instructions  Outcome: PROGRESSING AS EXPECTED     Problem: Pain  Goal: Alleviation of Pain or a reduction in pain to the patient's comfort goal  Outcome: PROGRESSING AS EXPECTED

## 2020-02-06 NOTE — ASSESSMENT & PLAN NOTE
Suspect viral gastroenteritis.  Urine drug screen is negative for marijuana  CT abdomen is negative for any acute finding  Lipase was negative  Persistent vomiting  Gastric emptying study is pending  Continue IV fluid and supportive care.  Advance diet as tolerated (she is still on clear liquid)  Start with omeprazole  Order labs for celiac disease, and H. pylori for possible dyspepsia.  Monitoring and consider GI consult tomorrow if there is no improving

## 2020-02-06 NOTE — PROGRESS NOTES
Assessment done, Pt educated on plan of care. Waiting on dr rounds  Patient resting in bed, no signs of distress,  no complaints of pain at this time. Call light within reach,  side rails up, will monitor. Condition stable states feeling little better at this time family at bedside

## 2020-02-06 NOTE — CARE PLAN
Problem: Infection  Goal: Will remain free from infection  Outcome: PROGRESSING AS EXPECTED  Intervention: Implement standard precautions and perform hand washing before and after patient contact  Note:   Practice aseptic techniques     Problem: Fluid Volume:  Goal: Will maintain balanced intake and output  Outcome: PROGRESSING AS EXPECTED  Note:   Administer anti- emetics, fluids.

## 2020-02-06 NOTE — DISCHARGE PLANNING
Care Transition Team Assessment    Spoke with patient at bedside. Lives alone [place]. Dad will be ride @ D/C. Has no PCP at present time. Anticipate no needs @ present time.    Information Source  Orientation : Oriented x 4  Information Given By: Patient    Readmission Evaluation  Is this a readmission?: No    Interdisciplinary Discharge Planning  Primary Care Physician: None  Lives with - Patient's Self Care Capacity: Alone and Able to Care For Self  Patient or legal guardian wants to designate a caregiver (see row info): No  Support Systems: Parent  Housing / Facility: 1 Story Apartment / Condo  Do You Take your Prescribed Medications Regularly: Yes  Able to Return to Previous ADL's: Yes  Mobility Issues: No  Prior Services: Home-Independent  Patient Expects to be Discharged to:: Home  Assistance Needed: No  Durable Medical Equipment: Not Applicable    Discharge Preparedness  What are your discharge supports?: Parent, Sibling  Prior Functional Level: Ambulatory    Functional Assesment  Prior Functional Level: Ambulatory    Finances  Prescription Coverage: Yes    Discharge Risks or Barriers  Patient risk factors: No PCP    Anticipated Discharge Information  Anticipated discharge disposition: Home  Discharge Address: [address] [place] [address]  Discharge Contact Phone Number: [phone]

## 2020-02-06 NOTE — ASSESSMENT & PLAN NOTE
Probably due to UTI but likely reactive to nausea and vomiting,   Improving  No fever and procalcitonin is negative  continue monitoring.

## 2020-02-06 NOTE — CARE PLAN
Problem: Pain  Goal: Alleviation of Pain or a reduction in pain to the patient's comfort goal  Outcome: PROGRESSING AS EXPECTED     Problem: Knowledge Deficit  Goal: Patient/Significant other demonstrates understanding of disease process, treatment plan, medications and discharge instructions  Outcome: PROGRESSING AS EXPECTED

## 2020-02-07 ENCOUNTER — APPOINTMENT (OUTPATIENT)
Dept: RADIOLOGY | Facility: MEDICAL CENTER | Age: 20
DRG: 690 | End: 2020-02-07
Attending: NURSE PRACTITIONER
Payer: COMMERCIAL

## 2020-02-07 LAB
ANION GAP SERPL CALC-SCNC: 8 MMOL/L (ref 0–11.9)
BACTERIA UR CULT: NORMAL
BASOPHILS # BLD AUTO: 0.2 % (ref 0–1.8)
BASOPHILS # BLD: 0.04 K/UL (ref 0–0.12)
BUN SERPL-MCNC: 14 MG/DL (ref 8–22)
CALCIUM SERPL-MCNC: 8.9 MG/DL (ref 8.5–10.5)
CHLORIDE SERPL-SCNC: 107 MMOL/L (ref 96–112)
CO2 SERPL-SCNC: 23 MMOL/L (ref 20–33)
CREAT SERPL-MCNC: 0.52 MG/DL (ref 0.5–1.4)
EOSINOPHIL # BLD AUTO: 0 K/UL (ref 0–0.51)
EOSINOPHIL NFR BLD: 0 % (ref 0–6.9)
ERYTHROCYTE [DISTWIDTH] IN BLOOD BY AUTOMATED COUNT: 41.4 FL (ref 35.9–50)
GLUCOSE SERPL-MCNC: 128 MG/DL (ref 65–99)
HCT VFR BLD AUTO: 39.3 % (ref 37–47)
HGB BLD-MCNC: 12.5 G/DL (ref 12–16)
IMM GRANULOCYTES # BLD AUTO: 0.09 K/UL (ref 0–0.11)
IMM GRANULOCYTES NFR BLD AUTO: 0.5 % (ref 0–0.9)
LYMPHOCYTES # BLD AUTO: 0.94 K/UL (ref 1–4.8)
LYMPHOCYTES NFR BLD: 5.7 % (ref 22–41)
MCH RBC QN AUTO: 26.7 PG (ref 27–33)
MCHC RBC AUTO-ENTMCNC: 31.8 G/DL (ref 33.6–35)
MCV RBC AUTO: 84 FL (ref 81.4–97.8)
MONOCYTES # BLD AUTO: 0.45 K/UL (ref 0–0.85)
MONOCYTES NFR BLD AUTO: 2.7 % (ref 0–13.4)
NEUTROPHILS # BLD AUTO: 14.97 K/UL (ref 2–7.15)
NEUTROPHILS NFR BLD: 90.9 % (ref 44–72)
NRBC # BLD AUTO: 0 K/UL
NRBC BLD-RTO: 0 /100 WBC
PLATELET # BLD AUTO: 298 K/UL (ref 164–446)
PMV BLD AUTO: 9.5 FL (ref 9–12.9)
POTASSIUM SERPL-SCNC: 3.7 MMOL/L (ref 3.6–5.5)
RBC # BLD AUTO: 4.68 M/UL (ref 4.2–5.4)
SIGNIFICANT IND 70042: NORMAL
SITE SITE: NORMAL
SODIUM SERPL-SCNC: 138 MMOL/L (ref 135–145)
SOURCE SOURCE: NORMAL
WBC # BLD AUTO: 16.5 K/UL (ref 4.8–10.8)

## 2020-02-07 PROCEDURE — A9270 NON-COVERED ITEM OR SERVICE: HCPCS | Performed by: HOSPITALIST

## 2020-02-07 PROCEDURE — 83516 IMMUNOASSAY NONANTIBODY: CPT

## 2020-02-07 PROCEDURE — G0378 HOSPITAL OBSERVATION PER HR: HCPCS

## 2020-02-07 PROCEDURE — 700105 HCHG RX REV CODE 258: Performed by: HOSPITALIST

## 2020-02-07 PROCEDURE — 82784 ASSAY IGA/IGD/IGG/IGM EACH: CPT

## 2020-02-07 PROCEDURE — 700102 HCHG RX REV CODE 250 W/ 637 OVERRIDE(OP): Performed by: HOSPITALIST

## 2020-02-07 PROCEDURE — 700111 HCHG RX REV CODE 636 W/ 250 OVERRIDE (IP): Performed by: NURSE PRACTITIONER

## 2020-02-07 PROCEDURE — 96376 TX/PRO/DX INJ SAME DRUG ADON: CPT

## 2020-02-07 PROCEDURE — 99225 PR SUBSEQUENT OBSERVATION CARE,LEVEL II: CPT | Performed by: INTERNAL MEDICINE

## 2020-02-07 PROCEDURE — 85025 COMPLETE CBC W/AUTO DIFF WBC: CPT

## 2020-02-07 PROCEDURE — 700105 HCHG RX REV CODE 258: Performed by: NURSE PRACTITIONER

## 2020-02-07 PROCEDURE — 80048 BASIC METABOLIC PNL TOTAL CA: CPT

## 2020-02-07 PROCEDURE — 96366 THER/PROPH/DIAG IV INF ADDON: CPT

## 2020-02-07 PROCEDURE — 700111 HCHG RX REV CODE 636 W/ 250 OVERRIDE (IP): Performed by: HOSPITALIST

## 2020-02-07 PROCEDURE — 96375 TX/PRO/DX INJ NEW DRUG ADDON: CPT

## 2020-02-07 RX ORDER — METOCLOPRAMIDE HYDROCHLORIDE 5 MG/ML
10 INJECTION INTRAMUSCULAR; INTRAVENOUS ONCE
Status: COMPLETED | OUTPATIENT
Start: 2020-02-07 | End: 2020-02-07

## 2020-02-07 RX ORDER — METOCLOPRAMIDE HYDROCHLORIDE 5 MG/ML
10 INJECTION INTRAMUSCULAR; INTRAVENOUS ONCE
Status: DISCONTINUED | OUTPATIENT
Start: 2020-02-07 | End: 2020-02-07

## 2020-02-07 RX ORDER — LORAZEPAM 2 MG/ML
0.5 INJECTION INTRAMUSCULAR EVERY 4 HOURS PRN
Status: DISCONTINUED | OUTPATIENT
Start: 2020-02-07 | End: 2020-02-08

## 2020-02-07 RX ADMIN — PROCHLORPERAZINE EDISYLATE 10 MG: 5 INJECTION INTRAMUSCULAR; INTRAVENOUS at 23:38

## 2020-02-07 RX ADMIN — CEFTRIAXONE SODIUM 2 G: 2 INJECTION, POWDER, FOR SOLUTION INTRAMUSCULAR; INTRAVENOUS at 05:46

## 2020-02-07 RX ADMIN — POTASSIUM CHLORIDE: 149 INJECTION, SOLUTION, CONCENTRATE INTRAVENOUS at 03:30

## 2020-02-07 RX ADMIN — POTASSIUM CHLORIDE: 149 INJECTION, SOLUTION, CONCENTRATE INTRAVENOUS at 18:04

## 2020-02-07 RX ADMIN — LORAZEPAM 0.5 MG: 2 INJECTION INTRAMUSCULAR; INTRAVENOUS at 15:25

## 2020-02-07 RX ADMIN — DIPHENHYDRAMINE HYDROCHLORIDE 12.5 MG: 25 TABLET ORAL at 05:46

## 2020-02-07 RX ADMIN — METOCLOPRAMIDE 10 MG: 5 INJECTION, SOLUTION INTRAMUSCULAR; INTRAVENOUS at 15:25

## 2020-02-07 RX ADMIN — ONDANSETRON 4 MG: 4 TABLET, ORALLY DISINTEGRATING ORAL at 03:28

## 2020-02-07 RX ADMIN — ONDANSETRON 4 MG: 2 INJECTION INTRAMUSCULAR; INTRAVENOUS at 21:51

## 2020-02-07 RX ADMIN — ONDANSETRON 4 MG: 4 TABLET, ORALLY DISINTEGRATING ORAL at 07:43

## 2020-02-07 RX ADMIN — PROMETHAZINE HYDROCHLORIDE 12.5 MG: 12.5 SUPPOSITORY RECTAL at 15:19

## 2020-02-07 ASSESSMENT — ENCOUNTER SYMPTOMS
SHORTNESS OF BREATH: 0
DIARRHEA: 0
DIZZINESS: 0
BLURRED VISION: 0
CONSTIPATION: 0
HEADACHES: 0
FEVER: 0
MYALGIAS: 0
WEAKNESS: 0
FLANK PAIN: 0
VOMITING: 0
SORE THROAT: 0
ABDOMINAL PAIN: 0
NAUSEA: 1

## 2020-02-07 ASSESSMENT — COGNITIVE AND FUNCTIONAL STATUS - GENERAL
SUGGESTED CMS G CODE MODIFIER DAILY ACTIVITY: CH
SUGGESTED CMS G CODE MODIFIER MOBILITY: CI
MOBILITY SCORE: 23
CLIMB 3 TO 5 STEPS WITH RAILING: A LITTLE
DAILY ACTIVITIY SCORE: 24

## 2020-02-07 NOTE — PROGRESS NOTES
Pt had emesis even after drinking water at times. Instructed to take only sips of water or ice chips until nausea resolved. Medicated per MAR.

## 2020-02-07 NOTE — PROGRESS NOTES
Pt a&ox4. On RA. Respirations equal and unlabored. Denies pain.  Ambulatory with steady gait. Repositions self in bed.  Good po intake without any s/sx of aspiration noted. DINH.  Plan of care reviewed including: nausea management, IV fluids. Last BM tonight. Verbalized understanding and agrees. White board updated. Instructed to use call light prior to getting oob to prevent falls. Able to make needs known.  Call light within reach. Family at bedside.

## 2020-02-07 NOTE — PROGRESS NOTES
Pt had large emesis after having a bite of jellmalu. Zofran given. Instructed to take little sips for now of water and ice chips only until nausea subsides.

## 2020-02-07 NOTE — PROGRESS NOTES
[facility] Medicine Daily Progress Note    Date of Service  2/7/2020    Chief Complaint  19 y.o. female admitted 2/5/2020 with intractable nausea vomiting.    Hospital Course    This is a 19-year-old female with no significant past medical history admitted with intractable nausea and vomiting.  Suspect viral gastroenteritis.      Interval Problem Update  2/6: Vomiting overall improved.  Continues to have nausea.  Continue IV hydration and antiemetics.  Denies abdominal pain.  Denies diarrhea.  Afebrile.  Vital signs stable.  Noted to have hyperglycemia.  Hemoglobin A1c pending.  2/7:  Continues to have episodes of vomiting with oral intake.  Trial dose of Reglan.  Continue supportive measures.  Denies abdominal pain.     Consultants/Specialty  N/A    Code Status  FULL    Disposition  Anticipate home when symptoms improved.    Review of Systems  Review of Systems   Constitutional: Positive for malaise/fatigue. Negative for fever.   HENT: Negative for congestion and sore throat.    Eyes: Negative for blurred vision.   Respiratory: Negative for shortness of breath.    Cardiovascular: Negative for chest pain and leg swelling.   Gastrointestinal: Positive for nausea. Negative for abdominal pain, constipation, diarrhea and vomiting.   Genitourinary: Negative for dysuria and flank pain.   Musculoskeletal: Negative for joint pain and myalgias.   Skin: Negative for rash.   Neurological: Negative for dizziness, weakness and headaches.        Physical Exam  Temp:  [36.4 °C (97.5 °F)-37.6 °C (99.6 °F)] 36.6 °C (97.9 °F)  Pulse:  [54-82] 54  Resp:  [16] 16  BP: (113-147)/(65-81) 130/70  SpO2:  [95 %-99 %] 97 %    Physical Exam  Vitals signs and nursing note reviewed.   Constitutional:       General: She is not in acute distress.     Appearance: Normal appearance. She is not toxic-appearing.   HENT:      Head: Normocephalic and atraumatic.      Nose: Nose normal.      Mouth/Throat:      Mouth: Mucous membranes are dry.       Pharynx: Oropharynx is clear. No oropharyngeal exudate.   Eyes:      General: No scleral icterus.     Conjunctiva/sclera: Conjunctivae normal.   Neck:      Musculoskeletal: Normal range of motion. No neck rigidity.   Cardiovascular:      Rate and Rhythm: Normal rate and regular rhythm.      Heart sounds: Normal heart sounds.   Pulmonary:      Effort: Pulmonary effort is normal. No respiratory distress.      Breath sounds: Normal breath sounds. No wheezing.   Abdominal:      General: Bowel sounds are normal. There is no distension.      Palpations: Abdomen is soft.      Tenderness: There is no tenderness.   Musculoskeletal: Normal range of motion.         General: No swelling.      Right lower leg: No edema.      Left lower leg: No edema.   Skin:     General: Skin is warm and dry.      Coloration: Skin is not jaundiced or pale.   Neurological:      General: No focal deficit present.      Mental Status: She is alert and oriented to person, place, and time. Mental status is at baseline.      Cranial Nerves: No cranial nerve deficit.   Psychiatric:         Mood and Affect: Mood normal.         Behavior: Behavior normal.         Thought Content: Thought content normal.         Judgment: Judgment normal.         Fluids    Intake/Output Summary (Last 24 hours) at 2/7/2020 1027  Last data filed at 2/7/2020 0500  Gross per 24 hour   Intake 1700 ml   Output 1600 ml   Net 100 ml       Laboratory  Recent Labs     02/05/20  0937 02/06/20  0432 02/07/20  0425   WBC 18.8* 17.0* 16.5*   RBC 5.36 4.83 4.68   HEMOGLOBIN 14.5 13.3 12.5   HEMATOCRIT 44.2 40.0 39.3   MCV 82.5 82.8 84.0   MCH 27.1 27.5 26.7*   MCHC 32.8* 33.3* 31.8*   RDW 39.0 40.4 41.4   PLATELETCT 362 327 298   MPV 10.1 9.6 9.5     Recent Labs     02/05/20  0937 02/06/20  0432 02/07/20  0425   SODIUM 137 141 138   POTASSIUM 3.8 3.5* 3.7   CHLORIDE 104 107 107   CO2 21 22 23   GLUCOSE 191* 124* 128*   BUN 13 14 14   CREATININE 0.64 0.50 0.52   CALCIUM 9.9 8.8 8.9                    Imaging  CT-ABDOMEN-PELVIS WITH   Final Result      Stable 9 cm pelvic teratoma      Nonvisualization of the appendix      Resolved right nephrolith           Assessment/Plan  Hyperglycemia- (present on admission)  Assessment & Plan  A1c 5.9  No need for insulin therapy at this time.     Leukocytosis- (present on admission)  Assessment & Plan  Probably due to UTI and reactive to nausea and vomiting, continue monitoring.    UTI (urinary tract infection)- (present on admission)  Assessment & Plan  Continue ceftriaxone.  Follow urine culture.    Intractable vomiting with nausea- (present on admission)  Assessment & Plan  Suspect viral gastroenteritis.  Persistent vomiting  Will trial dose of Reglan.   Continue IV fluid and supportive care.  Advance diet as tolerated.       VTE prophylaxis: Ambulatory,

## 2020-02-07 NOTE — PROGRESS NOTES
Assessment done, Pt educated on plan of care. Waiting on dr rounds  Patient resting in bed, no signs of distress,  no complaints of pain at this time. Call light within reach,  side rails up, will monitor. Condition stable patient wanting milk discussed re clear liquid diet and unable to keep clear liquids down medicated for nausea

## 2020-02-08 PROBLEM — D36.9 DERMOID TUMOR: Chronic | Status: ACTIVE | Noted: 2020-02-08

## 2020-02-08 LAB
ANION GAP SERPL CALC-SCNC: 13 MMOL/L (ref 0–11.9)
BASOPHILS # BLD AUTO: 0.4 % (ref 0–1.8)
BASOPHILS # BLD: 0.05 K/UL (ref 0–0.12)
BUN SERPL-MCNC: 12 MG/DL (ref 8–22)
CALCIUM SERPL-MCNC: 8.7 MG/DL (ref 8.5–10.5)
CHLORIDE SERPL-SCNC: 101 MMOL/L (ref 96–112)
CO2 SERPL-SCNC: 21 MMOL/L (ref 20–33)
CREAT SERPL-MCNC: 0.52 MG/DL (ref 0.5–1.4)
EKG IMPRESSION: NORMAL
EOSINOPHIL # BLD AUTO: 0.02 K/UL (ref 0–0.51)
EOSINOPHIL NFR BLD: 0.1 % (ref 0–6.9)
ERYTHROCYTE [DISTWIDTH] IN BLOOD BY AUTOMATED COUNT: 38.3 FL (ref 35.9–50)
GLUCOSE SERPL-MCNC: 111 MG/DL (ref 65–99)
HCT VFR BLD AUTO: 38.8 % (ref 37–47)
HGB BLD-MCNC: 12.8 G/DL (ref 12–16)
IMM GRANULOCYTES # BLD AUTO: 0.08 K/UL (ref 0–0.11)
IMM GRANULOCYTES NFR BLD AUTO: 0.6 % (ref 0–0.9)
LYMPHOCYTES # BLD AUTO: 2.48 K/UL (ref 1–4.8)
LYMPHOCYTES NFR BLD: 18.3 % (ref 22–41)
MCH RBC QN AUTO: 26.9 PG (ref 27–33)
MCHC RBC AUTO-ENTMCNC: 33 G/DL (ref 33.6–35)
MCV RBC AUTO: 81.5 FL (ref 81.4–97.8)
MONOCYTES # BLD AUTO: 0.99 K/UL (ref 0–0.85)
MONOCYTES NFR BLD AUTO: 7.3 % (ref 0–13.4)
NEUTROPHILS # BLD AUTO: 9.96 K/UL (ref 2–7.15)
NEUTROPHILS NFR BLD: 73.3 % (ref 44–72)
NRBC # BLD AUTO: 0 K/UL
NRBC BLD-RTO: 0 /100 WBC
PLATELET # BLD AUTO: 302 K/UL (ref 164–446)
PMV BLD AUTO: 9.6 FL (ref 9–12.9)
POTASSIUM SERPL-SCNC: 3.5 MMOL/L (ref 3.6–5.5)
RBC # BLD AUTO: 4.76 M/UL (ref 4.2–5.4)
SODIUM SERPL-SCNC: 135 MMOL/L (ref 135–145)
WBC # BLD AUTO: 13.6 K/UL (ref 4.8–10.8)

## 2020-02-08 PROCEDURE — 93010 ELECTROCARDIOGRAM REPORT: CPT | Performed by: INTERNAL MEDICINE

## 2020-02-08 PROCEDURE — 700111 HCHG RX REV CODE 636 W/ 250 OVERRIDE (IP): Performed by: HOSPITALIST

## 2020-02-08 PROCEDURE — 36415 COLL VENOUS BLD VENIPUNCTURE: CPT

## 2020-02-08 PROCEDURE — 80048 BASIC METABOLIC PNL TOTAL CA: CPT

## 2020-02-08 PROCEDURE — 770001 HCHG ROOM/CARE - MED/SURG/GYN PRIV*

## 2020-02-08 PROCEDURE — 700111 HCHG RX REV CODE 636 W/ 250 OVERRIDE (IP): Performed by: INTERNAL MEDICINE

## 2020-02-08 PROCEDURE — 96375 TX/PRO/DX INJ NEW DRUG ADDON: CPT

## 2020-02-08 PROCEDURE — 700111 HCHG RX REV CODE 636 W/ 250 OVERRIDE (IP): Performed by: NURSE PRACTITIONER

## 2020-02-08 PROCEDURE — C9113 INJ PANTOPRAZOLE SODIUM, VIA: HCPCS | Performed by: INTERNAL MEDICINE

## 2020-02-08 PROCEDURE — 93005 ELECTROCARDIOGRAM TRACING: CPT | Performed by: INTERNAL MEDICINE

## 2020-02-08 PROCEDURE — 99232 SBSQ HOSP IP/OBS MODERATE 35: CPT | Performed by: INTERNAL MEDICINE

## 2020-02-08 PROCEDURE — 96376 TX/PRO/DX INJ SAME DRUG ADON: CPT

## 2020-02-08 PROCEDURE — 85025 COMPLETE CBC W/AUTO DIFF WBC: CPT

## 2020-02-08 PROCEDURE — 700105 HCHG RX REV CODE 258: Performed by: NURSE PRACTITIONER

## 2020-02-08 PROCEDURE — 96372 THER/PROPH/DIAG INJ SC/IM: CPT

## 2020-02-08 RX ORDER — HALOPERIDOL 5 MG/ML
1 INJECTION INTRAMUSCULAR 2 TIMES DAILY PRN
Status: DISCONTINUED | OUTPATIENT
Start: 2020-02-08 | End: 2020-02-08

## 2020-02-08 RX ORDER — LORAZEPAM 2 MG/ML
0.5 INJECTION INTRAMUSCULAR EVERY 6 HOURS PRN
Status: DISCONTINUED | OUTPATIENT
Start: 2020-02-08 | End: 2020-02-09 | Stop reason: HOSPADM

## 2020-02-08 RX ORDER — HALOPERIDOL 5 MG/ML
0.5 INJECTION INTRAMUSCULAR 2 TIMES DAILY PRN
Status: DISCONTINUED | OUTPATIENT
Start: 2020-02-08 | End: 2020-02-09 | Stop reason: HOSPADM

## 2020-02-08 RX ORDER — PANTOPRAZOLE SODIUM 40 MG/10ML
40 INJECTION, POWDER, LYOPHILIZED, FOR SOLUTION INTRAVENOUS DAILY
Status: DISCONTINUED | OUTPATIENT
Start: 2020-02-08 | End: 2020-02-09 | Stop reason: HOSPADM

## 2020-02-08 RX ADMIN — POTASSIUM CHLORIDE: 149 INJECTION, SOLUTION, CONCENTRATE INTRAVENOUS at 02:47

## 2020-02-08 RX ADMIN — ONDANSETRON 4 MG: 2 INJECTION INTRAMUSCULAR; INTRAVENOUS at 08:14

## 2020-02-08 RX ADMIN — PANTOPRAZOLE SODIUM 40 MG: 40 INJECTION, POWDER, LYOPHILIZED, FOR SOLUTION INTRAVENOUS at 13:39

## 2020-02-08 RX ADMIN — ONDANSETRON 4 MG: 2 INJECTION INTRAMUSCULAR; INTRAVENOUS at 13:57

## 2020-02-08 RX ADMIN — ENOXAPARIN SODIUM 40 MG: 100 INJECTION SUBCUTANEOUS at 13:38

## 2020-02-08 RX ADMIN — POTASSIUM CHLORIDE: 149 INJECTION, SOLUTION, CONCENTRATE INTRAVENOUS at 12:28

## 2020-02-08 RX ADMIN — ONDANSETRON 4 MG: 2 INJECTION INTRAMUSCULAR; INTRAVENOUS at 02:51

## 2020-02-08 ASSESSMENT — ENCOUNTER SYMPTOMS
CONSTIPATION: 0
MYALGIAS: 0
HEADACHES: 0
NAUSEA: 1
FLANK PAIN: 0
DIZZINESS: 0
SORE THROAT: 0
BLURRED VISION: 0
SHORTNESS OF BREATH: 0
WEAKNESS: 0
ABDOMINAL PAIN: 0
FEVER: 0
VOMITING: 0
DIARRHEA: 0

## 2020-02-08 ASSESSMENT — PATIENT HEALTH QUESTIONNAIRE - PHQ9
2. FEELING DOWN, DEPRESSED, IRRITABLE, OR HOPELESS: NOT AT ALL
1. LITTLE INTEREST OR PLEASURE IN DOING THINGS: NOT AT ALL
SUM OF ALL RESPONSES TO PHQ9 QUESTIONS 1 AND 2: 0

## 2020-02-08 NOTE — PROGRESS NOTES
Surgery patient?: no  Date of surgery: n/a  Ambulated 50 ft on day of surgery? (N/A if today is not date of surgery): n/a  Number of times ambulated 50 feet or greater today: 3  Patient has been up to chair, edge of bed or HOB 90 degrees for all meals?: yes  Goal met? (goal is ambulating at least 50 feet 2 times on day shift, one time on night shift): yes  If patient did not meet mobility goal, why?:

## 2020-02-08 NOTE — CARE PLAN
Problem: Infection  Goal: Will remain free from infection  Outcome: PROGRESSING AS EXPECTED  Note:   Implement Standard precaution. Hand washing every encounter & before & after patient care. Verbalized understanding.      Problem: Safety  Goal: Will remain free from injury  Outcome: PROGRESSING AS EXPECTED  Note:   Safety precaution in effect. Call light within reach. Reminded patient to call for assist. Non skid socks in use. Hourly rounds in effect.     Problem: Knowledge Deficit  Goal: Knowledge of disease process/condition, treatment plan, diagnostic tests, and medications will improve  Outcome: PROGRESSING AS EXPECTED  Note:   Discussed Plan of care. Questions answered. Verbalized understanding.

## 2020-02-08 NOTE — PROGRESS NOTES
[facility] Medicine Daily Progress Note    Date of Service  2/8/2020    Chief Complaint  19 y.o. female admitted 2/5/2020 with intractable nausea vomiting.    Hospital Course    This is a 19-year-old female with no significant past medical history admitted with intractable nausea and vomiting.  Suspect viral gastroenteritis.      Interval Problem Update  2/6: Vomiting overall improved.  Continues to have nausea.  Continue IV hydration and antiemetics.  Denies abdominal pain.  Denies diarrhea.  Afebrile.  Vital signs stable.  Noted to have hyperglycemia.  Hemoglobin A1c pending.  2/7:  Continues to have episodes of vomiting with oral intake.  Trial dose of Reglan.  Continue supportive measures.  Denies abdominal pain.   2/8: The patient still having vomiting with nausea, improving on her leukocytosis, start with omeprazole and Haldol (discontinue Reglan, order EKG), order respiratory and celiac disease for dyspepsia.     Consultants/Specialty  N/A    Code Status  FULL    Disposition  Anticipate home when symptoms improved.    Review of Systems  Review of Systems   Constitutional: Positive for malaise/fatigue. Negative for fever.   HENT: Negative for congestion and sore throat.    Eyes: Negative for blurred vision.   Respiratory: Negative for shortness of breath.    Cardiovascular: Negative for chest pain and leg swelling.   Gastrointestinal: Positive for nausea. Negative for abdominal pain, constipation, diarrhea and vomiting.   Genitourinary: Negative for dysuria and flank pain.   Musculoskeletal: Negative for joint pain and myalgias.   Skin: Negative for rash.   Neurological: Negative for dizziness, weakness and headaches.        Physical Exam  Temp:  [36.1 °C (96.9 °F)-37.2 °C (98.9 °F)] 36.2 °C (97.1 °F)  Pulse:  [] 101  Resp:  [16] 16  BP: (119-143)/(67-82) 120/67  SpO2:  [96 %-98 %] 97 %    Physical Exam  Vitals signs and nursing note reviewed.   Constitutional:       General: She is not in acute  distress.     Appearance: Normal appearance. She is not toxic-appearing.   HENT:      Head: Normocephalic and atraumatic.      Nose: Nose normal.      Mouth/Throat:      Mouth: Mucous membranes are dry.      Pharynx: Oropharynx is clear. No oropharyngeal exudate.   Eyes:      General: No scleral icterus.     Conjunctiva/sclera: Conjunctivae normal.   Neck:      Musculoskeletal: Normal range of motion. No neck rigidity.   Cardiovascular:      Rate and Rhythm: Normal rate and regular rhythm.      Heart sounds: Normal heart sounds.   Pulmonary:      Effort: Pulmonary effort is normal. No respiratory distress.      Breath sounds: Normal breath sounds. No wheezing.   Abdominal:      General: Bowel sounds are normal. There is no distension.      Palpations: Abdomen is soft.      Tenderness: There is no tenderness.   Musculoskeletal: Normal range of motion.         General: No swelling.      Right lower leg: No edema.      Left lower leg: No edema.   Skin:     General: Skin is warm and dry.      Coloration: Skin is not jaundiced or pale.   Neurological:      General: No focal deficit present.      Mental Status: She is alert and oriented to person, place, and time. Mental status is at baseline.      Cranial Nerves: No cranial nerve deficit.   Psychiatric:         Mood and Affect: Mood normal.         Behavior: Behavior normal.         Thought Content: Thought content normal.         Judgment: Judgment normal.         Fluids    Intake/Output Summary (Last 24 hours) at 2/8/2020 1310  Last data filed at 2/8/2020 0805  Gross per 24 hour   Intake 200 ml   Output 1950 ml   Net -1750 ml       Laboratory  Recent Labs     02/06/20  0432 02/07/20  0425 02/08/20  0343   WBC 17.0* 16.5* 13.6*   RBC 4.83 4.68 4.76   HEMOGLOBIN 13.3 12.5 12.8   HEMATOCRIT 40.0 39.3 38.8   MCV 82.8 84.0 81.5   MCH 27.5 26.7* 26.9*   MCHC 33.3* 31.8* 33.0*   RDW 40.4 41.4 38.3   PLATELETCT 327 298 302   MPV 9.6 9.5 9.6     Recent Labs     02/06/20  0432  02/07/20  0425 02/08/20  0343   SODIUM 141 138 135   POTASSIUM 3.5* 3.7 3.5*   CHLORIDE 107 107 101   CO2 22 23 21   GLUCOSE 124* 128* 111*   BUN 14 14 12   CREATININE 0.50 0.52 0.52   CALCIUM 8.8 8.9 8.7                   Imaging  CT-ABDOMEN-PELVIS WITH   Final Result      Stable 9 cm pelvic teratoma      Nonvisualization of the appendix      Resolved right nephrolith           Assessment/Plan  * Intractable vomiting with nausea- (present on admission)  Assessment & Plan  Suspect viral gastroenteritis.  Urine drug screen is negative for marijuana  CT abdomen is negative for any acute finding  Lipase was negative  Persistent vomiting  Gastric emptying study is pending  Continue IV fluid and supportive care.  Advance diet as tolerated (she is still on clear liquid)  Start with omeprazole  Order labs for celiac disease, and H. pylori for possible dyspepsia.  Monitoring and consider GI consult tomorrow if there is no improving    Hyperglycemia- (present on admission)  Assessment & Plan  A1c 5.9  No need for insulin therapy at this time.     Leukocytosis- (present on admission)  Assessment & Plan  Probably due to UTI but likely reactive to nausea and vomiting,   Improving  No fever and procalcitonin is negative  continue monitoring.    UTI (urinary tract infection)- (present on admission)  Assessment & Plan  Continue ceftriaxone for 3 days.  Follow urine culture.    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  CT scan during this admission showed: Stable 9 cm pelvic teratoma  OB/GYN was consulted during last admission and he recommended follow-up as outpatient for surgery.        VTE prophylaxis: lovenox, SCds

## 2020-02-08 NOTE — CARE PLAN
Problem: Knowledge Deficit  Goal: Patient/Significant other demonstrates understanding of disease process, treatment plan, medications and discharge instructions  Outcome: PROGRESSING AS EXPECTED  Intervention: Learning assessment and teaching  Note:   Updated with plan of care, encourage to ask questions and concerns.     Problem: Safety  Goal: Will remain free from injury  Outcome: PROGRESSING AS EXPECTED  Intervention: Provide assistance with mobility  Flowsheets (Taken 2/7/2020 6480)  Assistance: Assistance of One  Note:   Encourages to call for any assistance needed, call light within reach.

## 2020-02-08 NOTE — ASSESSMENT & PLAN NOTE
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  CT scan during this admission showed: Stable 9 cm pelvic teratoma  OB/GYN was consulted during last admission and he recommended follow-up as outpatient for surgery.

## 2020-02-08 NOTE — PROGRESS NOTES
2 RN skin check complete.     Devices in place - .  Skin assessed under devices intact.      No skin breakdown noted.

## 2020-02-08 NOTE — PROGRESS NOTES
0700 Patient's in bed. Bedside report received from SAM Farrar RN at the beginning of the shift.    0800 Patient's sitting up in bed. IVF patent and infusing. No c/o's numbness or tingling, discomfort  To abdomen at this time. Fall Protocol in effect. Call light within reach. Reminded patient to call for assist. Assessment completed. No distress noted. Plan of care reviewed with the patient. Verbalized understanding.    0814 Medicated with Zofran (see MAR) for c/o's nausea and vomiting, noted 400 ml of tan colored emesis. Father and brother visiting.    0910 Dr Al visited. POC discussed with the patient.    1135 Patient's in bed. No distress noted. Brother at the bedside.    1225 Patient's in bed. Father visiting. No distress noted.    1259 New orders received from Dr Al (EKG, labs and medications - see MAR).    1323   EKG was done as per MD's order.    1357 Medicated with Zofran (see MAR) for c/o's nausea and vomiting, noted 400 ml of tan colored emesis. Family visiting.    1500 Patient's in bed. No distress noted. Family visiting.    1600 Ambulated patient in the hallway, noted with steady gait accompanied by Primary Nurse.    1720 Patient's sitting up in bed. family visiting. No distress noted.    1850  Patient's sitting up in bed. No changes in status. Bedside report given to Oncoming NOC RN (Leni). Father visiting.

## 2020-02-08 NOTE — PROGRESS NOTES
1739 Received patient from CDU via w/c accompanied by Tony CDU RN and family.    1800 Patient's in bed. No c/o' numbness or tingling at this time. Fall protocol in effect. Call light within reach. Reminded patient to call for assist. States discomfort to abdomen. Plan of care reviewed with the patient and family. Verbalized understanding. Educated patient and family of clear liquid diet only.     1850  Patient's I bed. No changes in status. Bedside report given to Oncoming NOC RN Thierry).

## 2020-02-08 NOTE — PROGRESS NOTES
Report from day RN, POC discussed, family at bedside, instructed not to bring foods from outside, patient on clears no red.

## 2020-02-09 VITALS
WEIGHT: 190.04 LBS | SYSTOLIC BLOOD PRESSURE: 123 MMHG | BODY MASS INDEX: 29.83 KG/M2 | HEIGHT: 67 IN | DIASTOLIC BLOOD PRESSURE: 72 MMHG | RESPIRATION RATE: 18 BRPM | OXYGEN SATURATION: 95 % | TEMPERATURE: 98.6 F | HEART RATE: 86 BPM

## 2020-02-09 PROBLEM — R11.2 INTRACTABLE VOMITING WITH NAUSEA: Status: RESOLVED | Noted: 2020-02-05 | Resolved: 2020-02-09

## 2020-02-09 PROBLEM — N39.0 UTI (URINARY TRACT INFECTION): Status: RESOLVED | Noted: 2020-02-05 | Resolved: 2020-02-09

## 2020-02-09 LAB
ALBUMIN SERPL BCP-MCNC: 3.8 G/DL (ref 3.2–4.9)
ALBUMIN/GLOB SERPL: 1.4 G/DL
ALP SERPL-CCNC: 46 U/L (ref 30–99)
ALT SERPL-CCNC: 9 U/L (ref 2–50)
ANION GAP SERPL CALC-SCNC: 10 MMOL/L (ref 0–11.9)
AST SERPL-CCNC: 13 U/L (ref 12–45)
BASOPHILS # BLD AUTO: 0.3 % (ref 0–1.8)
BASOPHILS # BLD: 0.04 K/UL (ref 0–0.12)
BILIRUB SERPL-MCNC: 0.7 MG/DL (ref 0.1–1.5)
BUN SERPL-MCNC: 9 MG/DL (ref 8–22)
CALCIUM SERPL-MCNC: 8.3 MG/DL (ref 8.5–10.5)
CHLORIDE SERPL-SCNC: 103 MMOL/L (ref 96–112)
CO2 SERPL-SCNC: 20 MMOL/L (ref 20–33)
CREAT SERPL-MCNC: 0.53 MG/DL (ref 0.5–1.4)
EOSINOPHIL # BLD AUTO: 0.17 K/UL (ref 0–0.51)
EOSINOPHIL NFR BLD: 1.3 % (ref 0–6.9)
ERYTHROCYTE [DISTWIDTH] IN BLOOD BY AUTOMATED COUNT: 38.4 FL (ref 35.9–50)
GLOBULIN SER CALC-MCNC: 2.8 G/DL (ref 1.9–3.5)
GLUCOSE SERPL-MCNC: 83 MG/DL (ref 65–99)
HCT VFR BLD AUTO: 36.7 % (ref 37–47)
HGB BLD-MCNC: 12.2 G/DL (ref 12–16)
IMM GRANULOCYTES # BLD AUTO: 0.06 K/UL (ref 0–0.11)
IMM GRANULOCYTES NFR BLD AUTO: 0.5 % (ref 0–0.9)
LYMPHOCYTES # BLD AUTO: 3.8 K/UL (ref 1–4.8)
LYMPHOCYTES NFR BLD: 28.6 % (ref 22–41)
MAGNESIUM SERPL-MCNC: 1.9 MG/DL (ref 1.5–2.5)
MCH RBC QN AUTO: 27.6 PG (ref 27–33)
MCHC RBC AUTO-ENTMCNC: 33.2 G/DL (ref 33.6–35)
MCV RBC AUTO: 83 FL (ref 81.4–97.8)
MONOCYTES # BLD AUTO: 1.02 K/UL (ref 0–0.85)
MONOCYTES NFR BLD AUTO: 7.7 % (ref 0–13.4)
NEUTROPHILS # BLD AUTO: 8.21 K/UL (ref 2–7.15)
NEUTROPHILS NFR BLD: 61.6 % (ref 44–72)
NRBC # BLD AUTO: 0 K/UL
NRBC BLD-RTO: 0 /100 WBC
PLATELET # BLD AUTO: 280 K/UL (ref 164–446)
PMV BLD AUTO: 9.6 FL (ref 9–12.9)
POTASSIUM SERPL-SCNC: 3.8 MMOL/L (ref 3.6–5.5)
PROCALCITONIN SERPL-MCNC: <0.05 NG/ML
PROT SERPL-MCNC: 6.6 G/DL (ref 6–8.2)
RBC # BLD AUTO: 4.42 M/UL (ref 4.2–5.4)
SODIUM SERPL-SCNC: 133 MMOL/L (ref 135–145)
TSH SERPL DL<=0.005 MIU/L-ACNC: 4.58 UIU/ML (ref 0.38–5.33)
WBC # BLD AUTO: 13.3 K/UL (ref 4.8–10.8)

## 2020-02-09 PROCEDURE — 36415 COLL VENOUS BLD VENIPUNCTURE: CPT

## 2020-02-09 PROCEDURE — 700111 HCHG RX REV CODE 636 W/ 250 OVERRIDE (IP): Performed by: NURSE PRACTITIONER

## 2020-02-09 PROCEDURE — 96376 TX/PRO/DX INJ SAME DRUG ADON: CPT

## 2020-02-09 PROCEDURE — C9113 INJ PANTOPRAZOLE SODIUM, VIA: HCPCS | Performed by: INTERNAL MEDICINE

## 2020-02-09 PROCEDURE — 700105 HCHG RX REV CODE 258: Performed by: NURSE PRACTITIONER

## 2020-02-09 PROCEDURE — 83735 ASSAY OF MAGNESIUM: CPT

## 2020-02-09 PROCEDURE — 99239 HOSP IP/OBS DSCHRG MGMT >30: CPT | Performed by: INTERNAL MEDICINE

## 2020-02-09 PROCEDURE — 700111 HCHG RX REV CODE 636 W/ 250 OVERRIDE (IP): Performed by: INTERNAL MEDICINE

## 2020-02-09 PROCEDURE — 84443 ASSAY THYROID STIM HORMONE: CPT

## 2020-02-09 PROCEDURE — 85025 COMPLETE CBC W/AUTO DIFF WBC: CPT

## 2020-02-09 PROCEDURE — 80053 COMPREHEN METABOLIC PANEL: CPT

## 2020-02-09 PROCEDURE — 84145 PROCALCITONIN (PCT): CPT

## 2020-02-09 RX ORDER — OMEPRAZOLE 40 MG/1
40 CAPSULE, DELAYED RELEASE ORAL DAILY
Qty: 30 CAP | Refills: 1 | Status: SHIPPED | OUTPATIENT
Start: 2020-02-09 | End: 2023-10-09

## 2020-02-09 RX ORDER — ONDANSETRON 4 MG/1
4 TABLET, ORALLY DISINTEGRATING ORAL EVERY 4 HOURS PRN
Qty: 30 TAB | Refills: 1 | Status: SHIPPED | OUTPATIENT
Start: 2020-02-09 | End: 2023-10-09

## 2020-02-09 RX ORDER — OMEPRAZOLE 40 MG/1
40 CAPSULE, DELAYED RELEASE ORAL DAILY
Qty: 30 CAP | Refills: 1 | Status: SHIPPED | OUTPATIENT
Start: 2020-02-09 | End: 2020-02-09 | Stop reason: SDUPTHER

## 2020-02-09 RX ORDER — ONDANSETRON 4 MG/1
4 TABLET, ORALLY DISINTEGRATING ORAL EVERY 4 HOURS PRN
Qty: 10 TAB | Refills: 0 | Status: SHIPPED | OUTPATIENT
Start: 2020-02-09 | End: 2020-02-09

## 2020-02-09 RX ADMIN — PANTOPRAZOLE SODIUM 40 MG: 40 INJECTION, POWDER, LYOPHILIZED, FOR SOLUTION INTRAVENOUS at 04:33

## 2020-02-09 RX ADMIN — POTASSIUM CHLORIDE: 149 INJECTION, SOLUTION, CONCENTRATE INTRAVENOUS at 01:00

## 2020-02-09 NOTE — CARE PLAN
Problem: Safety  Goal: Will remain free from injury  Outcome: PROGRESSING AS EXPECTED  Intervention: Provide assistance with mobility  Flowsheets (Taken 2/8/2020 9249)  Assistance: Standby Assist  Ambulation Tolerance: Tolerates Well  Note:   Encourage to call for any assistance needed, call light within reach.     Problem: Bowel/Gastric:  Goal: Normal bowel function is maintained or improved  Outcome: PROGRESSING AS EXPECTED  Intervention: Educate patient and significant other/support system about diet, fluid intake, medications and activity to promote bowel function  Note:   Patient and family instructed not to bring foods from outside, clear liquid diet as ordered.

## 2020-02-09 NOTE — DISCHARGE INSTRUCTIONS
Discharge Instructions    Discharged to home by car with relative. Discharged via wheelchair, hospital escort: Yes.  Special equipment needed: Not Applicable    Be sure to schedule a follow-up appointment with your primary care doctor or any specialists as instructed.     Discharge Plan:   Diet Plan: Discussed  Activity Level: Discussed  Confirmed Follow up Appointment: Patient to Call and Schedule Appointment  Confirmed Symptoms Management: Discussed  Medication Reconciliation Updated: Yes  Influenza Vaccine Indication: Not indicated: Previously immunized this influenza season and > 8 years of age    I understand that a diet low in cholesterol, fat, and sodium is recommended for good health. Unless I have been given specific instructions below for another diet, I accept this instruction as my diet prescription.   Other diet: Regular as tolerated    Special Instructions: None    · Is patient discharged on Warfarin / Coumadin?   No       Urinary Tract Infection, Adult  A urinary tract infection (UTI) is an infection of any part of the urinary tract, which includes the kidneys, ureters, bladder, and urethra. These organs make, store, and get rid of urine in the body. UTI can be a bladder infection (cystitis) or kidney infection (pyelonephritis).  What are the causes?  This infection may be caused by fungi, viruses, or bacteria. Bacteria are the most common cause of UTIs. This condition can also be caused by repeated incomplete emptying of the bladder during urination.  What increases the risk?  This condition is more likely to develop if:  · You ignore your need to urinate or hold urine for long periods of time.  · You do not empty your bladder completely during urination.  · You wipe back to front after urinating or having a bowel movement, if you are female.  · You are uncircumcised, if you are male.  · You are constipated.  · You have a urinary catheter that stays in place (indwelling).  · You have a weak defense  (immune) system.  · You have a medical condition that affects your bowels, kidneys, or bladder.  · You have diabetes.  · You take antibiotic medicines frequently or for long periods of time, and the antibiotics no longer work well against certain types of infections (antibiotic resistance).  · You take medicines that irritate your urinary tract.  · You are exposed to chemicals that irritate your urinary tract.  · You are female.  What are the signs or symptoms?  Symptoms of this condition include:  · Fever.  · Frequent urination or passing small amounts of urine frequently.  · Needing to urinate urgently.  · Pain or burning with urination.  · Urine that smells bad or unusual.  · Cloudy urine.  · Pain in the lower abdomen or back.  · Trouble urinating.  · Blood in the urine.  · Vomiting or being less hungry than normal.  · Diarrhea or abdominal pain.  · Vaginal discharge, if you are female.  How is this diagnosed?  This condition is diagnosed with a medical history and physical exam. You will also need to provide a urine sample to test your urine. Other tests may be done, including:  · Blood tests.  · Sexually transmitted disease (STD) testing.  If you have had more than one UTI, a cystoscopy or imaging studies may be done to determine the cause of the infections.  How is this treated?  Treatment for this condition often includes a combination of two or more of the following:  · Antibiotic medicine.  · Other medicines to treat less common causes of UTI.  · Over-the-counter medicines to treat pain.  · Drinking enough water to stay hydrated.  Follow these instructions at home:  · Take over-the-counter and prescription medicines only as told by your health care provider.  · If you were prescribed an antibiotic, take it as told by your health care provider. Do not stop taking the antibiotic even if you start to feel better.  · Avoid alcohol, caffeine, tea, and carbonated beverages. They can irritate your  bladder.  · Drink enough fluid to keep your urine clear or pale yellow.  · Keep all follow-up visits as told by your health care provider. This is important.  · Make sure to:  ¨ Empty your bladder often and completely. Do not hold urine for long periods of time.  ¨ Empty your bladder before and after sex.  ¨ Wipe from front to back after a bowel movement if you are female. Use each tissue one time when you wipe.  Contact a health care provider if:  · You have back pain.  · You have a fever.  · You feel nauseous or vomit.  · Your symptoms do not get better after 3 days.  · Your symptoms go away and then return.  Get help right away if:  · You have severe back pain or lower abdominal pain.  · You are vomiting and cannot keep down any medicines or water.  This information is not intended to replace advice given to you by your health care provider. Make sure you discuss any questions you have with your health care provider.  Document Released: 09/27/2006 Document Revised: 05/31/2017 Document Reviewed: 11/07/2016  [employer]) Interactive Patient Education © 2017 [employer] ([employer]      Nausea and Vomiting, Adult  Introduction  Feeling sick to your stomach (nausea) means that your stomach is upset or you feel like you have to throw up (vomit). Feeling more and more sick to your stomach can lead to throwing up. Throwing up happens when food and liquid from your stomach are thrown up and out the mouth. Throwing up can make you feel weak and cause you to get dehydrated. Dehydration can make you tired and thirsty, make you have a dry mouth, and make it so you pee (urinate) less often. Older adults and people with other diseases or a weak defense system (immune system) are at higher risk for dehydration. If you feel sick to your stomach or if you throw up, it is important to follow instructions from your doctor about how to take care of yourself.  Follow these instructions at home:  Eating and drinking  Follow these instructions as  told by your doctor:  · Take an oral rehydration solution (ORS). This is a drink that is sold at pharmacies and stores.  · Drink clear fluids in small amounts as you are able, such as:  ¨ Water.  ¨ Ice chips.  ¨ Diluted fruit juice.  ¨ Low-calorie sports drinks.  · Eat bland, easy-to-digest foods in small amounts as you are able, such as:  ¨ Bananas.  ¨ Applesauce.  ¨ Rice.  ¨ Low-fat (lean) meats.  ¨ Toast.  ¨ Crackers.  · Avoid fluids that have a lot of sugar or caffeine in them.  · Avoid alcohol.  · Avoid spicy or fatty foods.  General instructions  · Drink enough fluid to keep your pee (urine) clear or pale yellow.  · Wash your hands often. If you cannot use soap and water, use hand .  · Make sure that all people in your home wash their hands well and often.  · Take over-the-counter and prescription medicines only as told by your doctor.  · Rest at home while you get better.  · Watch your condition for any changes.  · Breathe slowly and deeply when you feel sick to your stomach.  · Keep all follow-up visits as told by your doctor. This is important.  Contact a doctor if:  · You have a fever.  · You cannot keep fluids down.  · Your symptoms get worse.  · You have new symptoms.  · You feel sick to your stomach for more than two days.  · You feel light-headed or dizzy.  · You have a headache.  · You have muscle cramps.  Get help right away if:  · You have pain in your chest, neck, arm, or jaw.  · You feel very weak or you pass out (faint).  · You throw up again and again.  · You see blood in your throw-up.  · Your throw-up looks like black coffee grounds.  · You have bloody or black poop (stools) or poop that look like tar.  · You have a very bad headache, a stiff neck, or both.  · You have a rash.  · You have very bad pain, cramping, or bloating in your belly (abdomen).  · You have trouble breathing.  · You are breathing very quickly.  · Your heart is beating very quickly.  · Your skin feels cold and  clammy.  · You feel confused.  · You have pain when you pee.  · You have signs of dehydration, such as:  ¨ Dark pee, hardly any pee, or no pee.  ¨ Cracked lips.  ¨ Dry mouth.  ¨ Sunken eyes.  ¨ Sleepiness.  ¨ Weakness.  These symptoms may be an emergency. Do not wait to see if the symptoms will go away. Get medical help right away. Call your local emergency services (911 in the [place] Do not drive yourself to the hospital.   This information is not intended to replace advice given to you by your health care provider. Make sure you discuss any questions you have with your health care provider.  Document Released: 06/05/2009 Document Revised: 07/07/2017 Document Reviewed: 08/23/2016  © 2017 Elsevier        Depression / Suicide Risk    As you are discharged from this [facility] facility, it is important to learn how to keep safe from harming yourself.    Recognize the warning signs:  · Abrupt changes in personality, positive or negative- including increase in energy   · Giving away possessions  · Change in eating patterns- significant weight changes-  positive or negative  · Change in sleeping patterns- unable to sleep or sleeping all the time   · Unwillingness or inability to communicate  · Depression  · Unusual sadness, discouragement and loneliness  · Talk of wanting to die  · Neglect of personal appearance   · Rebelliousness- reckless behavior  · Withdrawal from people/activities they love  · Confusion- inability to concentrate     If you or a loved one observes any of these behaviors or has concerns about self-harm, here's what you can do:  · Talk about it- your feelings and reasons for harming yourself  · Remove any means that you might use to hurt yourself (examples: pills, rope, extension cords, firearm)  · Get professional help from the community (Mental Health, Substance Abuse, psychological counseling)  · Do not be alone:Call your Safe Contact- someone whom you trust who will be there for you.  · Call  your local CRISIS HOTLINE 255-1579 or 698-724-5041  · Call your local Children's Mobile Crisis Response Team [place] (892) 661-8601 or www.[employer]  · Call the toll free National Suicide Prevention Hotlines   · National Suicide Prevention Lifeline 912-747-WYPJ (5313)  · National Hope Line Network 800-SUICIDE (246-1625)

## 2020-02-09 NOTE — DISCHARGE SUMMARY
Discharge Summary    CHIEF COMPLAINT ON ADMISSION  Chief Complaint   Patient presents with   • N/V     started around 2am. Fevers last night. denies pregnancy. pt states she ate rice and egg last night.        Reason for Admission  Nausea and vomiting/Suspect viral gastroenteritis.     Admission Date  2/5/2020    CODE STATUS  Full Code    HPI & HOSPITAL COURSE    19-year-old female with no significant past medical history presented 2/5 with increasing nausea and vomiting for couple days before this admission, the patient denied any chest pain or shortness of breath, but she had mild epigastric abdominal pain,no diarrhea and no sick contact, the patient had same symptoms couple months ago and resolved, also the patient denied any dysuria or hematuria, on admission CT of her abdomen did not show any acute finding, pregnancy test was negative lipase and urine drug screen were negative, EKG did not show any acute finding, UA showed white blood cell, blood culture and urine culture were negative, however the patient had leukocytosis and was treated with 3 days of ceftriaxone, IV fluid with PPI and anti-emetics medications her symptoms was resolved and did not need any intervention, celiac antibodies and H. pylori and stool are pending, to follow-up with primary care doctor and GI referral if needed.  We will discharge her on Zofran and omeprazole 40 mg daily.   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  Discussed the plan of treatment with the patient and her family, encouraged him to follow-up with primary care doctor and OB/GYN.    Therefore, she is discharged in good and stable condition to home with close outpatient follow-up.    The patient met 2-midnight criteria for an inpatient stay at the time of discharge.    Discharge Date  02/09/20      FOLLOW UP ITEMS POST DISCHARGE  Follow-up with  primary care doctor for nausea or vomiting, celiac and H. pylori results.  Follow-up with OB/GYN for teratoma tumor    DISCHARGE DIAGNOSES  Principal Problem (Resolved):    Intractable vomiting with nausea POA: Yes  Active Problems:    Leukocytosis POA: Yes    Hyperglycemia POA: Yes    Dermoid tumor (Chronic) POA: Yes  Resolved Problems:    UTI (urinary tract infection) POA: Yes      FOLLOW UP  Future Appointments   Date Time Provider Department Center   5/12/2020 10:00 AM [facility] [place] NURSE REBEKAH CORNELIUS     No follow-up provider specified.    MEDICATIONS ON DISCHARGE     Medication List      START taking these medications      Instructions   omeprazole 40 MG delayed-release capsule  Commonly known as:  PRILOSEC   Take 1 Cap by mouth every day.  Dose:  40 mg     ondansetron 4 MG Tbdp  Commonly known as:  ZOFRAN ODT   Take 1 Tab by mouth every four hours as needed for Nausea (give PO if no IV route available).  Dose:  4 mg            Allergies  No Known Allergies    DIET  Orders Placed This Encounter   Procedures   • Diet Order Full Liquid     Standing Status:   Standing     Number of Occurrences:   1     Order Specific Question:   Diet:     Answer:   Full Liquid [11]       ACTIVITY  As tolerated.  Weight bearing as tolerated    CONSULTATIONS  None    PROCEDURES  None    LABORATORY  Lab Results   Component Value Date    SODIUM 133 (L) 02/09/2020    POTASSIUM 3.8 02/09/2020    CHLORIDE 103 02/09/2020    CO2 20 02/09/2020    GLUCOSE 83 02/09/2020    BUN 9 02/09/2020    CREATININE 0.53 02/09/2020        Lab Results   Component Value Date    WBC 13.3 (H) 02/09/2020    HEMOGLOBIN 12.2 02/09/2020    HEMATOCRIT 36.7 (L) 02/09/2020    PLATELETCT 280 02/09/2020        Total time of the discharge process exceeds 32 minutes.

## 2020-02-10 LAB
IGA SERPL-MCNC: 273 MG/DL (ref 68–408)
TTG IGA SER IA-ACNC: 1 U/ML (ref 0–3)

## 2020-03-10 ENCOUNTER — HOSPITAL ENCOUNTER (OUTPATIENT)
Dept: RADIOLOGY | Facility: MEDICAL CENTER | Age: 20
End: 2020-03-10
Attending: FAMILY MEDICINE
Payer: COMMERCIAL

## 2020-03-10 DIAGNOSIS — R11.2 NAUSEA AND VOMITING, INTRACTABILITY OF VOMITING NOT SPECIFIED, UNSPECIFIED VOMITING TYPE: ICD-10-CM

## 2020-03-10 PROCEDURE — 76700 US EXAM ABDOM COMPLETE: CPT

## 2020-03-26 ENCOUNTER — EH NON-PROVIDER (OUTPATIENT)
Dept: OCCUPATIONAL MEDICINE | Facility: CLINIC | Age: 20
End: 2020-03-26

## 2020-03-26 DIAGNOSIS — Z01.89 RESPIRATORY CLEARANCE EXAMINATION, ENCOUNTER FOR: ICD-10-CM

## 2020-03-26 PROCEDURE — 94375 RESPIRATORY FLOW VOLUME LOOP: CPT | Performed by: FAMILY MEDICINE

## 2020-04-22 ENCOUNTER — HOSPITAL ENCOUNTER (OUTPATIENT)
Dept: RADIOLOGY | Facility: MEDICAL CENTER | Age: 20
End: 2020-04-22
Attending: OBSTETRICS & GYNECOLOGY
Payer: COMMERCIAL

## 2020-04-22 DIAGNOSIS — R10.2 PELVIC PAIN: ICD-10-CM

## 2020-04-22 DIAGNOSIS — N83.209 CYST OF OVARY, UNSPECIFIED LATERALITY: ICD-10-CM

## 2020-04-22 PROCEDURE — 76830 TRANSVAGINAL US NON-OB: CPT

## 2020-06-16 DIAGNOSIS — Z01.812 PRE-OPERATIVE LABORATORY EXAMINATION: ICD-10-CM

## 2020-06-16 LAB
AMORPH CRY #/AREA URNS HPF: PRESENT /HPF
ANION GAP SERPL CALC-SCNC: 11 MMOL/L (ref 7–16)
APPEARANCE UR: ABNORMAL
BACTERIA #/AREA URNS HPF: NEGATIVE /HPF
BASOPHILS # BLD AUTO: 0.3 % (ref 0–1.8)
BASOPHILS # BLD: 0.03 K/UL (ref 0–0.12)
BILIRUB UR QL STRIP.AUTO: NEGATIVE
BUN SERPL-MCNC: 12 MG/DL (ref 8–22)
CALCIUM SERPL-MCNC: 9.4 MG/DL (ref 8.5–10.5)
CHLORIDE SERPL-SCNC: 104 MMOL/L (ref 96–112)
CO2 SERPL-SCNC: 24 MMOL/L (ref 20–33)
COLOR UR: YELLOW
CREAT SERPL-MCNC: 0.52 MG/DL (ref 0.5–1.4)
EOSINOPHIL # BLD AUTO: 0.1 K/UL (ref 0–0.51)
EOSINOPHIL NFR BLD: 1.1 % (ref 0–6.9)
EPI CELLS #/AREA URNS HPF: NEGATIVE /HPF
ERYTHROCYTE [DISTWIDTH] IN BLOOD BY AUTOMATED COUNT: 40.8 FL (ref 35.9–50)
GLUCOSE SERPL-MCNC: 112 MG/DL (ref 65–99)
GLUCOSE UR STRIP.AUTO-MCNC: NEGATIVE MG/DL
HCG SERPL QL: NEGATIVE
HCT VFR BLD AUTO: 44.1 % (ref 37–47)
HGB BLD-MCNC: 14.2 G/DL (ref 12–16)
HYALINE CASTS #/AREA URNS LPF: ABNORMAL /LPF
IMM GRANULOCYTES # BLD AUTO: 0.04 K/UL (ref 0–0.11)
IMM GRANULOCYTES NFR BLD AUTO: 0.4 % (ref 0–0.9)
KETONES UR STRIP.AUTO-MCNC: NEGATIVE MG/DL
LEUKOCYTE ESTERASE UR QL STRIP.AUTO: ABNORMAL
LYMPHOCYTES # BLD AUTO: 2.51 K/UL (ref 1–4.8)
LYMPHOCYTES NFR BLD: 28 % (ref 22–41)
MCH RBC QN AUTO: 26.6 PG (ref 27–33)
MCHC RBC AUTO-ENTMCNC: 32.2 G/DL (ref 33.6–35)
MCV RBC AUTO: 82.6 FL (ref 81.4–97.8)
MICRO URNS: ABNORMAL
MONOCYTES # BLD AUTO: 0.53 K/UL (ref 0–0.85)
MONOCYTES NFR BLD AUTO: 5.9 % (ref 0–13.4)
MUCOUS THREADS #/AREA URNS HPF: ABNORMAL /HPF
NEUTROPHILS # BLD AUTO: 5.74 K/UL (ref 2–7.15)
NEUTROPHILS NFR BLD: 64.3 % (ref 44–72)
NITRITE UR QL STRIP.AUTO: NEGATIVE
NRBC # BLD AUTO: 0 K/UL
NRBC BLD-RTO: 0 /100 WBC
PH UR STRIP.AUTO: 5 [PH] (ref 5–8)
PLATELET # BLD AUTO: 356 K/UL (ref 164–446)
PMV BLD AUTO: 9.6 FL (ref 9–12.9)
POTASSIUM SERPL-SCNC: 4.1 MMOL/L (ref 3.6–5.5)
PROT UR QL STRIP: NEGATIVE MG/DL
RBC # BLD AUTO: 5.34 M/UL (ref 4.2–5.4)
RBC # URNS HPF: ABNORMAL /HPF
RBC UR QL AUTO: NEGATIVE
SODIUM SERPL-SCNC: 139 MMOL/L (ref 135–145)
SP GR UR STRIP.AUTO: 1.03
UROBILINOGEN UR STRIP.AUTO-MCNC: 0.2 MG/DL
WBC # BLD AUTO: 9 K/UL (ref 4.8–10.8)
WBC #/AREA URNS HPF: ABNORMAL /HPF

## 2020-06-16 PROCEDURE — 81001 URINALYSIS AUTO W/SCOPE: CPT

## 2020-06-16 PROCEDURE — 36415 COLL VENOUS BLD VENIPUNCTURE: CPT

## 2020-06-16 PROCEDURE — 80048 BASIC METABOLIC PNL TOTAL CA: CPT

## 2020-06-16 PROCEDURE — 84703 CHORIONIC GONADOTROPIN ASSAY: CPT

## 2020-06-16 PROCEDURE — 85025 COMPLETE CBC W/AUTO DIFF WBC: CPT

## 2020-06-16 ASSESSMENT — FIBROSIS 4 INDEX: FIB4 SCORE: 0.29

## 2020-06-17 ENCOUNTER — OFFICE VISIT (OUTPATIENT)
Dept: ADMISSIONS | Facility: MEDICAL CENTER | Age: 20
DRG: 743 | End: 2020-06-17
Attending: OBSTETRICS & GYNECOLOGY
Payer: COMMERCIAL

## 2020-06-17 DIAGNOSIS — Z01.812 PRE-OPERATIVE LABORATORY EXAMINATION: ICD-10-CM

## 2020-06-17 LAB — COVID ORDER STATUS COVID19: NORMAL

## 2020-06-17 PROCEDURE — U0003 INFECTIOUS AGENT DETECTION BY NUCLEIC ACID (DNA OR RNA); SEVERE ACUTE RESPIRATORY SYNDROME CORONAVIRUS 2 (SARS-COV-2) (CORONAVIRUS DISEASE [COVID-19]), AMPLIFIED PROBE TECHNIQUE, MAKING USE OF HIGH THROUGHPUT TECHNOLOGIES AS DESCRIBED BY CMS-2020-01-R: HCPCS

## 2020-06-17 PROCEDURE — C9803 HOPD COVID-19 SPEC COLLECT: HCPCS

## 2020-06-18 LAB
SARS-COV-2 RNA RESP QL NAA+PROBE: NOTDETECTED
SPECIMEN SOURCE: NORMAL

## 2020-06-21 ENCOUNTER — ANESTHESIA EVENT (OUTPATIENT)
Dept: SURGERY | Facility: MEDICAL CENTER | Age: 20
DRG: 743 | End: 2020-06-21
Payer: COMMERCIAL

## 2020-06-22 ENCOUNTER — HOSPITAL ENCOUNTER (INPATIENT)
Facility: MEDICAL CENTER | Age: 20
LOS: 3 days | DRG: 743 | End: 2020-06-25
Attending: OBSTETRICS & GYNECOLOGY | Admitting: OBSTETRICS & GYNECOLOGY
Payer: COMMERCIAL

## 2020-06-22 ENCOUNTER — ANESTHESIA (OUTPATIENT)
Dept: SURGERY | Facility: MEDICAL CENTER | Age: 20
DRG: 743 | End: 2020-06-22
Payer: COMMERCIAL

## 2020-06-22 LAB — HCG UR QL: NEGATIVE

## 2020-06-22 PROCEDURE — A9270 NON-COVERED ITEM OR SERVICE: HCPCS | Performed by: OBSTETRICS & GYNECOLOGY

## 2020-06-22 PROCEDURE — 81025 URINE PREGNANCY TEST: CPT

## 2020-06-22 PROCEDURE — 0U910ZZ DRAINAGE OF LEFT OVARY, OPEN APPROACH: ICD-10-PCS | Performed by: OBSTETRICS & GYNECOLOGY

## 2020-06-22 PROCEDURE — 700111 HCHG RX REV CODE 636 W/ 250 OVERRIDE (IP): Performed by: STUDENT IN AN ORGANIZED HEALTH CARE EDUCATION/TRAINING PROGRAM

## 2020-06-22 PROCEDURE — 0UB00ZZ EXCISION OF RIGHT OVARY, OPEN APPROACH: ICD-10-PCS | Performed by: OBSTETRICS & GYNECOLOGY

## 2020-06-22 PROCEDURE — 700101 HCHG RX REV CODE 250

## 2020-06-22 PROCEDURE — A9270 NON-COVERED ITEM OR SERVICE: HCPCS

## 2020-06-22 PROCEDURE — 700105 HCHG RX REV CODE 258: Performed by: OBSTETRICS & GYNECOLOGY

## 2020-06-22 PROCEDURE — 160029 HCHG SURGERY MINUTES - 1ST 30 MINS LEVEL 4: Performed by: OBSTETRICS & GYNECOLOGY

## 2020-06-22 PROCEDURE — 700101 HCHG RX REV CODE 250: Performed by: STUDENT IN AN ORGANIZED HEALTH CARE EDUCATION/TRAINING PROGRAM

## 2020-06-22 PROCEDURE — 501838 HCHG SUTURE GENERAL: Performed by: OBSTETRICS & GYNECOLOGY

## 2020-06-22 PROCEDURE — 88307 TISSUE EXAM BY PATHOLOGIST: CPT

## 2020-06-22 PROCEDURE — 160002 HCHG RECOVERY MINUTES (STAT): Performed by: OBSTETRICS & GYNECOLOGY

## 2020-06-22 PROCEDURE — 700102 HCHG RX REV CODE 250 W/ 637 OVERRIDE(OP)

## 2020-06-22 PROCEDURE — A4338 INDWELLING CATHETER LATEX: HCPCS | Performed by: OBSTETRICS & GYNECOLOGY

## 2020-06-22 PROCEDURE — 160041 HCHG SURGERY MINUTES - EA ADDL 1 MIN LEVEL 4: Performed by: OBSTETRICS & GYNECOLOGY

## 2020-06-22 PROCEDURE — 700112 HCHG RX REV CODE 229: Performed by: OBSTETRICS & GYNECOLOGY

## 2020-06-22 PROCEDURE — A6404 STERILE GAUZE > 48 SQ IN: HCPCS | Performed by: OBSTETRICS & GYNECOLOGY

## 2020-06-22 PROCEDURE — 770001 HCHG ROOM/CARE - MED/SURG/GYN PRIV*

## 2020-06-22 PROCEDURE — 700111 HCHG RX REV CODE 636 W/ 250 OVERRIDE (IP): Performed by: OBSTETRICS & GYNECOLOGY

## 2020-06-22 PROCEDURE — 160036 HCHG PACU - EA ADDL 30 MINS PHASE I: Performed by: OBSTETRICS & GYNECOLOGY

## 2020-06-22 PROCEDURE — A6402 STERILE GAUZE <= 16 SQ IN: HCPCS | Performed by: OBSTETRICS & GYNECOLOGY

## 2020-06-22 PROCEDURE — 160048 HCHG OR STATISTICAL LEVEL 1-5: Performed by: OBSTETRICS & GYNECOLOGY

## 2020-06-22 PROCEDURE — 700102 HCHG RX REV CODE 250 W/ 637 OVERRIDE(OP): Performed by: STUDENT IN AN ORGANIZED HEALTH CARE EDUCATION/TRAINING PROGRAM

## 2020-06-22 PROCEDURE — 160035 HCHG PACU - 1ST 60 MINS PHASE I: Performed by: OBSTETRICS & GYNECOLOGY

## 2020-06-22 PROCEDURE — 160009 HCHG ANES TIME/MIN: Performed by: OBSTETRICS & GYNECOLOGY

## 2020-06-22 PROCEDURE — 503051 HCHG CLOSURE PRINEO SKIN: Performed by: OBSTETRICS & GYNECOLOGY

## 2020-06-22 PROCEDURE — 700111 HCHG RX REV CODE 636 W/ 250 OVERRIDE (IP)

## 2020-06-22 PROCEDURE — A9270 NON-COVERED ITEM OR SERVICE: HCPCS | Performed by: STUDENT IN AN ORGANIZED HEALTH CARE EDUCATION/TRAINING PROGRAM

## 2020-06-22 PROCEDURE — C1765 ADHESION BARRIER: HCPCS | Performed by: OBSTETRICS & GYNECOLOGY

## 2020-06-22 PROCEDURE — 700102 HCHG RX REV CODE 250 W/ 637 OVERRIDE(OP): Performed by: OBSTETRICS & GYNECOLOGY

## 2020-06-22 RX ORDER — OXYCODONE HCL 5 MG/5 ML
10 SOLUTION, ORAL ORAL
Status: COMPLETED | OUTPATIENT
Start: 2020-06-22 | End: 2020-06-22

## 2020-06-22 RX ORDER — HYDROMORPHONE HYDROCHLORIDE 2 MG/ML
INJECTION, SOLUTION INTRAMUSCULAR; INTRAVENOUS; SUBCUTANEOUS PRN
Status: DISCONTINUED | OUTPATIENT
Start: 2020-06-22 | End: 2020-06-22 | Stop reason: SURG

## 2020-06-22 RX ORDER — CEFAZOLIN SODIUM 1 G/3ML
INJECTION, POWDER, FOR SOLUTION INTRAMUSCULAR; INTRAVENOUS PRN
Status: DISCONTINUED | OUTPATIENT
Start: 2020-06-22 | End: 2020-06-22 | Stop reason: SURG

## 2020-06-22 RX ORDER — ONDANSETRON 2 MG/ML
4 INJECTION INTRAMUSCULAR; INTRAVENOUS EVERY 4 HOURS PRN
Status: DISCONTINUED | OUTPATIENT
Start: 2020-06-22 | End: 2020-06-25 | Stop reason: HOSPADM

## 2020-06-22 RX ORDER — OXYCODONE HCL 5 MG/5 ML
5 SOLUTION, ORAL ORAL
Status: COMPLETED | OUTPATIENT
Start: 2020-06-22 | End: 2020-06-22

## 2020-06-22 RX ORDER — ACETAMINOPHEN 500 MG
1000 TABLET ORAL ONCE
Status: COMPLETED | OUTPATIENT
Start: 2020-06-22 | End: 2020-06-22

## 2020-06-22 RX ORDER — ONDANSETRON 2 MG/ML
4 INJECTION INTRAMUSCULAR; INTRAVENOUS
Status: DISCONTINUED | OUTPATIENT
Start: 2020-06-22 | End: 2020-06-22 | Stop reason: HOSPADM

## 2020-06-22 RX ORDER — SODIUM CHLORIDE, SODIUM LACTATE, POTASSIUM CHLORIDE, CALCIUM CHLORIDE 600; 310; 30; 20 MG/100ML; MG/100ML; MG/100ML; MG/100ML
INJECTION, SOLUTION INTRAVENOUS EVERY 6 HOURS
Status: COMPLETED | OUTPATIENT
Start: 2020-06-22 | End: 2020-06-23

## 2020-06-22 RX ORDER — LIDOCAINE HYDROCHLORIDE 20 MG/ML
INJECTION, SOLUTION EPIDURAL; INFILTRATION; INTRACAUDAL; PERINEURAL PRN
Status: DISCONTINUED | OUTPATIENT
Start: 2020-06-22 | End: 2020-06-22 | Stop reason: SURG

## 2020-06-22 RX ORDER — ROCURONIUM BROMIDE 10 MG/ML
INJECTION, SOLUTION INTRAVENOUS PRN
Status: DISCONTINUED | OUTPATIENT
Start: 2020-06-22 | End: 2020-06-22 | Stop reason: SURG

## 2020-06-22 RX ORDER — CELECOXIB 200 MG/1
200 CAPSULE ORAL ONCE
Status: COMPLETED | OUTPATIENT
Start: 2020-06-22 | End: 2020-06-22

## 2020-06-22 RX ORDER — HYDROMORPHONE HYDROCHLORIDE 1 MG/ML
0.2 INJECTION, SOLUTION INTRAMUSCULAR; INTRAVENOUS; SUBCUTANEOUS
Status: DISCONTINUED | OUTPATIENT
Start: 2020-06-22 | End: 2020-06-22 | Stop reason: HOSPADM

## 2020-06-22 RX ORDER — OXYCODONE HYDROCHLORIDE 10 MG/1
10 TABLET ORAL
Status: DISCONTINUED | OUTPATIENT
Start: 2020-06-22 | End: 2020-06-25 | Stop reason: HOSPADM

## 2020-06-22 RX ORDER — DEXAMETHASONE SODIUM PHOSPHATE 4 MG/ML
INJECTION, SOLUTION INTRA-ARTICULAR; INTRALESIONAL; INTRAMUSCULAR; INTRAVENOUS; SOFT TISSUE
Status: COMPLETED
Start: 2020-06-22 | End: 2020-06-22

## 2020-06-22 RX ORDER — HALOPERIDOL 5 MG/ML
1 INJECTION INTRAMUSCULAR EVERY 6 HOURS PRN
Status: DISCONTINUED | OUTPATIENT
Start: 2020-06-22 | End: 2020-06-25 | Stop reason: HOSPADM

## 2020-06-22 RX ORDER — POLYETHYLENE GLYCOL 3350 17 G/17G
1 POWDER, FOR SOLUTION ORAL 2 TIMES DAILY PRN
Status: DISCONTINUED | OUTPATIENT
Start: 2020-06-22 | End: 2020-06-25 | Stop reason: HOSPADM

## 2020-06-22 RX ORDER — SODIUM CHLORIDE, SODIUM LACTATE, POTASSIUM CHLORIDE, CALCIUM CHLORIDE 600; 310; 30; 20 MG/100ML; MG/100ML; MG/100ML; MG/100ML
INJECTION, SOLUTION INTRAVENOUS CONTINUOUS
Status: DISCONTINUED | OUTPATIENT
Start: 2020-06-22 | End: 2020-06-22 | Stop reason: HOSPADM

## 2020-06-22 RX ORDER — ENEMA 19; 7 G/133ML; G/133ML
1 ENEMA RECTAL
Status: DISCONTINUED | OUTPATIENT
Start: 2020-06-22 | End: 2020-06-25 | Stop reason: HOSPADM

## 2020-06-22 RX ORDER — MEPERIDINE HYDROCHLORIDE 25 MG/ML
12.5 INJECTION INTRAMUSCULAR; INTRAVENOUS; SUBCUTANEOUS
Status: DISCONTINUED | OUTPATIENT
Start: 2020-06-22 | End: 2020-06-22 | Stop reason: HOSPADM

## 2020-06-22 RX ORDER — DEXAMETHASONE SODIUM PHOSPHATE 4 MG/ML
INJECTION, SOLUTION INTRA-ARTICULAR; INTRALESIONAL; INTRAMUSCULAR; INTRAVENOUS; SOFT TISSUE PRN
Status: DISCONTINUED | OUTPATIENT
Start: 2020-06-22 | End: 2020-06-22 | Stop reason: SURG

## 2020-06-22 RX ORDER — DIPHENHYDRAMINE HYDROCHLORIDE 50 MG/ML
25 INJECTION INTRAMUSCULAR; INTRAVENOUS EVERY 6 HOURS PRN
Status: DISCONTINUED | OUTPATIENT
Start: 2020-06-22 | End: 2020-06-25 | Stop reason: HOSPADM

## 2020-06-22 RX ORDER — DEXAMETHASONE SODIUM PHOSPHATE 4 MG/ML
4 INJECTION, SOLUTION INTRA-ARTICULAR; INTRALESIONAL; INTRAMUSCULAR; INTRAVENOUS; SOFT TISSUE
Status: COMPLETED | OUTPATIENT
Start: 2020-06-22 | End: 2020-06-23

## 2020-06-22 RX ORDER — MORPHINE SULFATE 4 MG/ML
4 INJECTION, SOLUTION INTRAMUSCULAR; INTRAVENOUS
Status: DISCONTINUED | OUTPATIENT
Start: 2020-06-22 | End: 2020-06-25 | Stop reason: HOSPADM

## 2020-06-22 RX ORDER — HYDROMORPHONE HYDROCHLORIDE 1 MG/ML
0.1 INJECTION, SOLUTION INTRAMUSCULAR; INTRAVENOUS; SUBCUTANEOUS
Status: DISCONTINUED | OUTPATIENT
Start: 2020-06-22 | End: 2020-06-22 | Stop reason: HOSPADM

## 2020-06-22 RX ORDER — HYDROMORPHONE HYDROCHLORIDE 1 MG/ML
0.4 INJECTION, SOLUTION INTRAMUSCULAR; INTRAVENOUS; SUBCUTANEOUS
Status: DISCONTINUED | OUTPATIENT
Start: 2020-06-22 | End: 2020-06-22 | Stop reason: HOSPADM

## 2020-06-22 RX ORDER — SCOLOPAMINE TRANSDERMAL SYSTEM 1 MG/1
1 PATCH, EXTENDED RELEASE TRANSDERMAL
Status: DISCONTINUED | OUTPATIENT
Start: 2020-06-22 | End: 2020-06-25 | Stop reason: HOSPADM

## 2020-06-22 RX ORDER — DOCUSATE SODIUM 100 MG/1
100 CAPSULE, LIQUID FILLED ORAL 2 TIMES DAILY
Status: DISCONTINUED | OUTPATIENT
Start: 2020-06-22 | End: 2020-06-25 | Stop reason: HOSPADM

## 2020-06-22 RX ORDER — BISACODYL 10 MG
10 SUPPOSITORY, RECTAL RECTAL
Status: DISCONTINUED | OUTPATIENT
Start: 2020-06-22 | End: 2020-06-25 | Stop reason: HOSPADM

## 2020-06-22 RX ORDER — SODIUM CHLORIDE, SODIUM LACTATE, POTASSIUM CHLORIDE, CALCIUM CHLORIDE 600; 310; 30; 20 MG/100ML; MG/100ML; MG/100ML; MG/100ML
INJECTION, SOLUTION INTRAVENOUS CONTINUOUS
Status: ACTIVE | OUTPATIENT
Start: 2020-06-22 | End: 2020-06-23

## 2020-06-22 RX ORDER — AMOXICILLIN 250 MG
1 CAPSULE ORAL
Status: DISCONTINUED | OUTPATIENT
Start: 2020-06-22 | End: 2020-06-25 | Stop reason: HOSPADM

## 2020-06-22 RX ORDER — KETOROLAC TROMETHAMINE 30 MG/ML
30 INJECTION, SOLUTION INTRAMUSCULAR; INTRAVENOUS EVERY 6 HOURS
Status: DISCONTINUED | OUTPATIENT
Start: 2020-06-22 | End: 2020-06-23

## 2020-06-22 RX ORDER — HALOPERIDOL 5 MG/ML
1 INJECTION INTRAMUSCULAR
Status: DISCONTINUED | OUTPATIENT
Start: 2020-06-22 | End: 2020-06-22 | Stop reason: HOSPADM

## 2020-06-22 RX ORDER — ONDANSETRON 2 MG/ML
INJECTION INTRAMUSCULAR; INTRAVENOUS PRN
Status: DISCONTINUED | OUTPATIENT
Start: 2020-06-22 | End: 2020-06-22 | Stop reason: SURG

## 2020-06-22 RX ORDER — DIPHENHYDRAMINE HYDROCHLORIDE 50 MG/ML
12.5 INJECTION INTRAMUSCULAR; INTRAVENOUS
Status: DISCONTINUED | OUTPATIENT
Start: 2020-06-22 | End: 2020-06-22 | Stop reason: HOSPADM

## 2020-06-22 RX ORDER — CELECOXIB 200 MG/1
200 CAPSULE ORAL 2 TIMES DAILY
Status: DISCONTINUED | OUTPATIENT
Start: 2020-06-23 | End: 2020-06-23

## 2020-06-22 RX ORDER — OXYCODONE HYDROCHLORIDE 5 MG/1
5 TABLET ORAL
Status: DISCONTINUED | OUTPATIENT
Start: 2020-06-22 | End: 2020-06-25 | Stop reason: HOSPADM

## 2020-06-22 RX ORDER — AMOXICILLIN 250 MG
1 CAPSULE ORAL NIGHTLY
Status: DISCONTINUED | OUTPATIENT
Start: 2020-06-22 | End: 2020-06-25 | Stop reason: HOSPADM

## 2020-06-22 RX ORDER — BUPIVACAINE HYDROCHLORIDE AND EPINEPHRINE 2.5; 5 MG/ML; UG/ML
INJECTION, SOLUTION EPIDURAL; INFILTRATION; INTRACAUDAL; PERINEURAL
Status: DISPENSED
Start: 2020-06-22 | End: 2020-06-23

## 2020-06-22 RX ORDER — MIDAZOLAM HYDROCHLORIDE 1 MG/ML
INJECTION INTRAMUSCULAR; INTRAVENOUS PRN
Status: DISCONTINUED | OUTPATIENT
Start: 2020-06-22 | End: 2020-06-22 | Stop reason: SURG

## 2020-06-22 RX ORDER — BUPIVACAINE HYDROCHLORIDE AND EPINEPHRINE 2.5; 5 MG/ML; UG/ML
INJECTION, SOLUTION INFILTRATION; PERINEURAL
Status: DISCONTINUED | OUTPATIENT
Start: 2020-06-22 | End: 2020-06-22 | Stop reason: HOSPADM

## 2020-06-22 RX ADMIN — CELECOXIB 200 MG: 200 CAPSULE ORAL at 11:48

## 2020-06-22 RX ADMIN — ONDANSETRON 4 MG: 2 INJECTION INTRAMUSCULAR; INTRAVENOUS at 19:39

## 2020-06-22 RX ADMIN — ROCURONIUM BROMIDE 50 MG: 10 INJECTION, SOLUTION INTRAVENOUS at 14:34

## 2020-06-22 RX ADMIN — DOCUSATE SODIUM 100 MG: 100 CAPSULE, LIQUID FILLED ORAL at 21:28

## 2020-06-22 RX ADMIN — DEXAMETHASONE SODIUM PHOSPHATE 4 MG: 4 INJECTION, SOLUTION INTRA-ARTICULAR; INTRALESIONAL; INTRAMUSCULAR; INTRAVENOUS; SOFT TISSUE at 14:34

## 2020-06-22 RX ADMIN — FENTANYL CITRATE 25 MCG: 50 INJECTION INTRAMUSCULAR; INTRAVENOUS at 17:07

## 2020-06-22 RX ADMIN — PROPOFOL 200 MG: 10 INJECTION, EMULSION INTRAVENOUS at 14:34

## 2020-06-22 RX ADMIN — MORPHINE SULFATE 4 MG: 4 INJECTION INTRAVENOUS at 22:09

## 2020-06-22 RX ADMIN — OXYCODONE HYDROCHLORIDE 10 MG: 5 SOLUTION ORAL at 17:02

## 2020-06-22 RX ADMIN — MIDAZOLAM HYDROCHLORIDE 2 MG: 1 INJECTION, SOLUTION INTRAMUSCULAR; INTRAVENOUS at 14:30

## 2020-06-22 RX ADMIN — HYDROMORPHONE HYDROCHLORIDE 0.5 MG: 2 INJECTION, SOLUTION INTRAMUSCULAR; INTRAVENOUS; SUBCUTANEOUS at 14:34

## 2020-06-22 RX ADMIN — DEXAMETHASONE SODIUM PHOSPHATE 4 MG: 4 INJECTION, SOLUTION INTRA-ARTICULAR; INTRALESIONAL; INTRAMUSCULAR; INTRAVENOUS; SOFT TISSUE at 22:17

## 2020-06-22 RX ADMIN — DIPHENHYDRAMINE HYDROCHLORIDE 25 MG: 50 INJECTION INTRAMUSCULAR; INTRAVENOUS at 23:43

## 2020-06-22 RX ADMIN — SENNOSIDES AND DOCUSATE SODIUM 1 TABLET: 8.6; 5 TABLET ORAL at 21:28

## 2020-06-22 RX ADMIN — POVIDONE-IODINE 15 ML: 10 SOLUTION TOPICAL at 11:51

## 2020-06-22 RX ADMIN — KETOROLAC TROMETHAMINE 30 MG: 30 INJECTION, SOLUTION INTRAMUSCULAR at 19:31

## 2020-06-22 RX ADMIN — FENTANYL CITRATE 50 MCG: 50 INJECTION INTRAMUSCULAR; INTRAVENOUS at 14:32

## 2020-06-22 RX ADMIN — SODIUM CHLORIDE, POTASSIUM CHLORIDE, SODIUM LACTATE AND CALCIUM CHLORIDE: 600; 310; 30; 20 INJECTION, SOLUTION INTRAVENOUS at 19:44

## 2020-06-22 RX ADMIN — ONDANSETRON 4 MG: 2 INJECTION INTRAMUSCULAR; INTRAVENOUS at 15:43

## 2020-06-22 RX ADMIN — SUGAMMADEX 200 MG: 100 INJECTION, SOLUTION INTRAVENOUS at 16:03

## 2020-06-22 RX ADMIN — FENTANYL CITRATE 50 MCG: 50 INJECTION INTRAMUSCULAR; INTRAVENOUS at 16:05

## 2020-06-22 RX ADMIN — ACETAMINOPHEN 1000 MG: 500 TABLET ORAL at 11:48

## 2020-06-22 RX ADMIN — LIDOCAINE HYDROCHLORIDE 100 MG: 20 INJECTION, SOLUTION EPIDURAL; INFILTRATION; INTRACAUDAL at 14:34

## 2020-06-22 RX ADMIN — FENTANYL CITRATE 25 MCG: 50 INJECTION INTRAMUSCULAR; INTRAVENOUS at 17:01

## 2020-06-22 RX ADMIN — HYDROMORPHONE HYDROCHLORIDE 0.2 MG: 1 INJECTION, SOLUTION INTRAMUSCULAR; INTRAVENOUS; SUBCUTANEOUS at 17:16

## 2020-06-22 RX ADMIN — FENTANYL CITRATE 50 MCG: 50 INJECTION INTRAMUSCULAR; INTRAVENOUS at 17:14

## 2020-06-22 RX ADMIN — SODIUM CHLORIDE, POTASSIUM CHLORIDE, SODIUM LACTATE AND CALCIUM CHLORIDE: 600; 310; 30; 20 INJECTION, SOLUTION INTRAVENOUS at 11:51

## 2020-06-22 RX ADMIN — CEFAZOLIN 2 G: 330 INJECTION, POWDER, FOR SOLUTION INTRAMUSCULAR; INTRAVENOUS at 14:37

## 2020-06-22 ASSESSMENT — FIBROSIS 4 INDEX: FIB4 SCORE: 0.23

## 2020-06-22 NOTE — ANESTHESIA PROCEDURE NOTES
Airway    Date/Time: 6/22/2020 2:35 PM  Performed by: Malik Patrick M.D.  Authorized by: Malik Patrick M.D.     Location:  OR  Urgency:  Elective  Indications for Airway Management:  Anesthesia      Spontaneous Ventilation: absent    Sedation Level:  Deep  Preoxygenated: Yes    Patient Position:  Sniffing  Final Airway Type:  Endotracheal airway  Final Endotracheal Airway:  ETT  Cuffed: Yes    Technique Used for Successful ETT Placement:  Direct laryngoscopy    Insertion Site:  Oral  Blade Type:  Yue  Laryngoscope Blade/Videolaryngoscope Blade Size:  3  ETT Size (mm):  7.0  Measured from:  Teeth  ETT to Teeth (cm):  23  Placement Verified by: auscultation and capnometry    Cormack-Lehane Classification:  Grade IIa - partial view of glottis  Number of Attempts at Approach:  1

## 2020-06-22 NOTE — H&P
HISTORY AND PHYSICAL     PRE-OP DIAGNOSIS:    1. Dermoid cyst, right ovary  2. Pelvic pains    PROPOSED SURGICAL PROCEDURE:    1. MINI-LAPAROTOMY  2. RIGHT OVARIAN CYSTECTOMY VS RIGHT OOPHORECTOMY  3. ANY OTHER INDICATED PROCEDURE    HISTORY OF PRESENT ILLNESS:    Julianna is a 19 year old who presents for above surgical procedure.   Pt went to the ER at [facility] 4 months ago because of N/V  A CT scan and subsequent pelvic US was done and an incidental finding of a 8 cm ovarian cyst was seen that was suspicious for dermoid.  Pt has on and off pelvic pains   Pt periods are somewhat regular, moderate flow associated with mild cramping  Pt hasn't initiated intercourse  Denies abnormal vaginal discharge  Her GM is trying to explain that the patient has been suffering from chronic nausea and vomiting for the past 5 years.   She was worked up when she was in the [facility], and all evaluation came back negative. N/V is not related to eating, no precipitating no alleviating factors.  She is currently on Zofran and Phenergan supp  She established PCP with Dr HUGO Simpsno     Review of systems:  Pertinent positives documented in HPI and all other systems reviewed & are negative    All PMH, PSH, allergies, social history and FH reviewed and updated today:  No past medical history on file.    No past surgical history on file.    Allergies: No Known Allergies    Social History     Socioeconomic History   • Marital status: Single     Spouse name: Not on file   • Number of children: Not on file   • Years of education: Not on file   • Highest education level: Not on file   Occupational History   • Not on file   Social Needs   • Financial resource strain: Not on file   • Food insecurity     Worry: Not on file     Inability: Not on file   • Transportation needs     Medical: Not on file     Non-medical: Not on file   Tobacco Use   • Smoking status: Never Smoker   • Smokeless tobacco: Never Used   Substance and Sexual Activity   • Alcohol  "use: Not Currently   • Drug use: Not Currently   • Sexual activity: Not on file   Lifestyle   • Physical activity     Days per week: Not on file     Minutes per session: Not on file   • Stress: Not on file   Relationships   • Social connections     Talks on phone: Not on file     Gets together: Not on file     Attends Oriental orthodox service: Not on file     Active member of club or organization: Not on file     Attends meetings of clubs or organizations: Not on file     Relationship status: Not on file   • Intimate partner violence     Fear of current or ex partner: Not on file     Emotionally abused: Not on file     Physically abused: Not on file     Forced sexual activity: Not on file   Other Topics Concern   • Not on file   Social History Narrative   • Not on file       No family history on file.    Physical exam:  Ht 1.676 m (5' 6\")   Wt 89.6 kg (197 lb 8.5 oz)     General:appears stated age, is in no apparent distress, is well developed and well nourished  Head: normocephalic, non-tender  Neck: neck is supple  CV : regular rate and rhythm, no peripheral edema  Lungs: Normal respiratory effort. Clear to auscultation bilaterally  Abdomen: Bowel sounds positive, nondistended, soft, nontender x4, no rebound or guarding. No organomegaly. No masses.  Female GYN: deferred   Skin: No rashes, or ulcers or lesions seen  Psychiatric: Patient shows appropriate affect, is alert and oriented x3, intact judgment     ASSESSMENT AND PLAN:     1. DERMOID CYST, RIGHT    2. PELVIC PAINS    PROPOSED SURGICAL PROCEDURE:    1. MINI-LAPAROTOMY  2. RIGHT OVARIAN CYSTECTOMY VS RIGHT OOPHORECTOMY  3. ANY OTHER INDICATED PROCEDURE    Risks and benefits of open abdominal procedure with mini-laparotomy are discussed with patient today. Specific risks are infection of the incision, bleeding requiring transfusion, scar to the exterior or interior of abdomen that may be a cause for abdomino/pelvic pain in the future, damage to other structures " including bowel, bladder or ureters.   Goal is right ovarian preservation, however, if there is no normal ovarian tissue visualized or bleeding encountered, that may necessitate removal of the right ovary.     After discussion of risks and benefits, patient had opportunity to ask questions pertaining to indications/risks benefits of surgery. All questions have been answered and laparoscopic consent signed.    Postoperative course discussed with the patient and precautions given. Patient is to return to the emergency room or office if she has uncontrolled pain, heavy vaginal bleeding, fever, or inability to tolerate p.o. or move bowels.    MIROSLAVA COLORADO MD

## 2020-06-22 NOTE — OR SURGEON
Immediate Post OP Note    PreOp Diagnosis: Right Dermoid Cyst                                Pelvic pain    PostOp Diagnosis: same    Procedure(s):  MINILAPAROTOMY - Wound Class: Clean  Right Oophorocystectomy, removal of Dermoid cyst - Wound Class: Clean  Left Ovarian cyst drainage     Surgeon(s):  Mary Jesus M.D.    Anesthesiologist/Type of Anesthesia:  Anesthesiologist: Malik Patrick M.D./General    Surgical Staff:  Circulator: Harriett Washington R.N.; Monica Santoyo R.N.  Relief Scrub: Essence Johnson R.N.  Scrub Person: Karen Salter    Specimens removed if any:  ID Type Source Tests Collected by Time Destination   A : Right dermoid cyst Other Other PATHOLOGY SPECIMEN Mary Jesus M.D. 6/22/2020  3:44 PM        Estimated Blood Loss: < 50 cc    Findings: Right ovary is cystically enlarged to 6-8cm - dermoid                  LO is cystic with clear fluid                  Bilateral fallopian tubes normal                  Uterus is normal                    Complications: none    6/22/2020 4:24 PM Mary Jesus M.D.

## 2020-06-22 NOTE — OR NURSING
1616 Received pt from OR, received report from OR RN and anesthesiologist. Pt sleeping, supine, oral airway in place. Pt has clear dressing to abdomen, CDI.     1621 Pt waking up, oral airway dc'd without difficulty.     1644 Report to Na GARNER.

## 2020-06-22 NOTE — ANESTHESIA TIME REPORT
Anesthesia Start and Stop Event Times     Date Time Event    6/22/2020 1429 Ready for Procedure     1429 Anesthesia Start     1616 Anesthesia Stop        Responsible Staff  06/22/20    Name Role Begin End    Malik Patrick M.D. Anesth 1429 1616        Preop Diagnosis (Free Text):  Pre-op Diagnosis     DERMOID CYST, RIGHT OVARY        Preop Diagnosis (Codes):    Post op Diagnosis  Dermoid cyst      Premium Reason  A. 3PM - 7AM    Comments:

## 2020-06-22 NOTE — OR NURSING
1645 assumed care of pt report recvd from Radha andrews pt is resting comfortable no pain or nausea oxy mask at 10 liters on for eras protocol sats 100% garcia to down drainage bag   1701 medicated iv fent and po pain meds   1708 medicated iv fent cold pack to inc area to help with incisional  pain   1715 remed iv fent   1720 medicated iv dilaudid crying in pain   1727 pain now at tolerable level updated pt grandmother  1800 resting comfortable inc site covered with clear dressing cdi without bleeding no bleeding to geovani pad without nausea pain controlled updated pt grandmother again and notified of pt room number she will be transferring to   1815 ready room report called to Rama andrews   1833 transport here pt transferred to room s 350 via [facility] with all belongings

## 2020-06-23 LAB
ALBUMIN SERPL BCP-MCNC: 4 G/DL (ref 3.2–4.9)
ALBUMIN/GLOB SERPL: 1.3 G/DL
ALP SERPL-CCNC: 67 U/L (ref 30–99)
ALT SERPL-CCNC: 12 U/L (ref 2–50)
ANION GAP SERPL CALC-SCNC: 15 MMOL/L (ref 7–16)
AST SERPL-CCNC: 14 U/L (ref 12–45)
BASOPHILS # BLD AUTO: 0.1 % (ref 0–1.8)
BASOPHILS # BLD: 0.02 K/UL (ref 0–0.12)
BILIRUB SERPL-MCNC: 0.4 MG/DL (ref 0.1–1.5)
BUN SERPL-MCNC: 13 MG/DL (ref 8–22)
CALCIUM SERPL-MCNC: 8.9 MG/DL (ref 8.5–10.5)
CHLORIDE SERPL-SCNC: 103 MMOL/L (ref 96–112)
CO2 SERPL-SCNC: 21 MMOL/L (ref 20–33)
CREAT SERPL-MCNC: 0.53 MG/DL (ref 0.5–1.4)
EOSINOPHIL # BLD AUTO: 0 K/UL (ref 0–0.51)
EOSINOPHIL NFR BLD: 0 % (ref 0–6.9)
ERYTHROCYTE [DISTWIDTH] IN BLOOD BY AUTOMATED COUNT: 39 FL (ref 35.9–50)
GLOBULIN SER CALC-MCNC: 3.1 G/DL (ref 1.9–3.5)
GLUCOSE SERPL-MCNC: 138 MG/DL (ref 65–99)
HCT VFR BLD AUTO: 36.7 % (ref 37–47)
HGB BLD-MCNC: 12.3 G/DL (ref 12–16)
IMM GRANULOCYTES # BLD AUTO: 0.13 K/UL (ref 0–0.11)
IMM GRANULOCYTES NFR BLD AUTO: 0.7 % (ref 0–0.9)
LYMPHOCYTES # BLD AUTO: 1.21 K/UL (ref 1–4.8)
LYMPHOCYTES NFR BLD: 6.4 % (ref 22–41)
MCH RBC QN AUTO: 27.2 PG (ref 27–33)
MCHC RBC AUTO-ENTMCNC: 33.5 G/DL (ref 33.6–35)
MCV RBC AUTO: 81 FL (ref 81.4–97.8)
MONOCYTES # BLD AUTO: 1.69 K/UL (ref 0–0.85)
MONOCYTES NFR BLD AUTO: 8.9 % (ref 0–13.4)
NEUTROPHILS # BLD AUTO: 15.9 K/UL (ref 2–7.15)
NEUTROPHILS NFR BLD: 83.9 % (ref 44–72)
NRBC # BLD AUTO: 0 K/UL
NRBC BLD-RTO: 0 /100 WBC
PATHOLOGY CONSULT NOTE: NORMAL
PLATELET # BLD AUTO: 302 K/UL (ref 164–446)
PMV BLD AUTO: 9.6 FL (ref 9–12.9)
POTASSIUM SERPL-SCNC: 3.5 MMOL/L (ref 3.6–5.5)
PROT SERPL-MCNC: 7.1 G/DL (ref 6–8.2)
RBC # BLD AUTO: 4.53 M/UL (ref 4.2–5.4)
SODIUM SERPL-SCNC: 139 MMOL/L (ref 135–145)
WBC # BLD AUTO: 19 K/UL (ref 4.8–10.8)

## 2020-06-23 PROCEDURE — 700102 HCHG RX REV CODE 250 W/ 637 OVERRIDE(OP): Performed by: OBSTETRICS & GYNECOLOGY

## 2020-06-23 PROCEDURE — 700101 HCHG RX REV CODE 250: Performed by: OBSTETRICS & GYNECOLOGY

## 2020-06-23 PROCEDURE — 80053 COMPREHEN METABOLIC PANEL: CPT

## 2020-06-23 PROCEDURE — 700105 HCHG RX REV CODE 258: Performed by: OBSTETRICS & GYNECOLOGY

## 2020-06-23 PROCEDURE — 770001 HCHG ROOM/CARE - MED/SURG/GYN PRIV*

## 2020-06-23 PROCEDURE — A9270 NON-COVERED ITEM OR SERVICE: HCPCS | Performed by: OBSTETRICS & GYNECOLOGY

## 2020-06-23 PROCEDURE — 700105 HCHG RX REV CODE 258

## 2020-06-23 PROCEDURE — 700111 HCHG RX REV CODE 636 W/ 250 OVERRIDE (IP): Performed by: OBSTETRICS & GYNECOLOGY

## 2020-06-23 PROCEDURE — 36415 COLL VENOUS BLD VENIPUNCTURE: CPT

## 2020-06-23 PROCEDURE — 85025 COMPLETE CBC W/AUTO DIFF WBC: CPT

## 2020-06-23 RX ORDER — PROMETHAZINE HYDROCHLORIDE 25 MG/1
12.5 SUPPOSITORY RECTAL EVERY 4 HOURS PRN
Status: DISCONTINUED | OUTPATIENT
Start: 2020-06-23 | End: 2020-06-25 | Stop reason: HOSPADM

## 2020-06-23 RX ORDER — PROMETHAZINE HYDROCHLORIDE 12.5 MG/1
12.5 SUPPOSITORY RECTAL EVERY 6 HOURS PRN
Status: DISCONTINUED | OUTPATIENT
Start: 2020-06-23 | End: 2020-06-23

## 2020-06-23 RX ORDER — SODIUM CHLORIDE, SODIUM LACTATE, POTASSIUM CHLORIDE, CALCIUM CHLORIDE 600; 310; 30; 20 MG/100ML; MG/100ML; MG/100ML; MG/100ML
INJECTION, SOLUTION INTRAVENOUS
Status: COMPLETED
Start: 2020-06-23 | End: 2020-06-23

## 2020-06-23 RX ORDER — SODIUM CHLORIDE 9 MG/ML
INJECTION, SOLUTION INTRAVENOUS
Status: COMPLETED
Start: 2020-06-23 | End: 2020-06-23

## 2020-06-23 RX ORDER — KETOROLAC TROMETHAMINE 30 MG/ML
30 INJECTION, SOLUTION INTRAMUSCULAR; INTRAVENOUS EVERY 6 HOURS PRN
Status: DISCONTINUED | OUTPATIENT
Start: 2020-06-23 | End: 2020-06-25 | Stop reason: HOSPADM

## 2020-06-23 RX ORDER — SODIUM CHLORIDE AND POTASSIUM CHLORIDE 150; 900 MG/100ML; MG/100ML
INJECTION, SOLUTION INTRAVENOUS CONTINUOUS
Status: DISCONTINUED | OUTPATIENT
Start: 2020-06-23 | End: 2020-06-25 | Stop reason: HOSPADM

## 2020-06-23 RX ORDER — ACETAMINOPHEN 325 MG/1
650 TABLET ORAL EVERY 4 HOURS PRN
Status: DISCONTINUED | OUTPATIENT
Start: 2020-06-23 | End: 2020-06-25 | Stop reason: HOSPADM

## 2020-06-23 RX ORDER — SODIUM CHLORIDE 9 MG/ML
INJECTION, SOLUTION INTRAVENOUS CONTINUOUS
Status: DISCONTINUED | OUTPATIENT
Start: 2020-06-23 | End: 2020-06-25 | Stop reason: HOSPADM

## 2020-06-23 RX ADMIN — ONDANSETRON 4 MG: 2 INJECTION INTRAMUSCULAR; INTRAVENOUS at 06:42

## 2020-06-23 RX ADMIN — ONDANSETRON 4 MG: 2 INJECTION INTRAMUSCULAR; INTRAVENOUS at 11:11

## 2020-06-23 RX ADMIN — DEXAMETHASONE SODIUM PHOSPHATE 4 MG: 4 INJECTION, SOLUTION INTRA-ARTICULAR; INTRALESIONAL; INTRAMUSCULAR; INTRAVENOUS; SOFT TISSUE at 20:19

## 2020-06-23 RX ADMIN — POTASSIUM CHLORIDE AND SODIUM CHLORIDE: 900; 150 INJECTION, SOLUTION INTRAVENOUS at 18:18

## 2020-06-23 RX ADMIN — SODIUM CHLORIDE: 9 INJECTION, SOLUTION INTRAVENOUS at 15:39

## 2020-06-23 RX ADMIN — KETOROLAC TROMETHAMINE 30 MG: 30 INJECTION, SOLUTION INTRAMUSCULAR at 02:45

## 2020-06-23 RX ADMIN — KETOROLAC TROMETHAMINE 30 MG: 30 INJECTION, SOLUTION INTRAMUSCULAR at 11:14

## 2020-06-23 RX ADMIN — ONDANSETRON 4 MG: 2 INJECTION INTRAMUSCULAR; INTRAVENOUS at 21:46

## 2020-06-23 RX ADMIN — PROMETHAZINE HYDROCHLORIDE 12.5 MG: 12.5 SUPPOSITORY RECTAL at 12:41

## 2020-06-23 RX ADMIN — ACETAMINOPHEN 650 MG: 325 TABLET, FILM COATED ORAL at 22:06

## 2020-06-23 RX ADMIN — SCOPALAMINE 1 PATCH: 1 PATCH, EXTENDED RELEASE TRANSDERMAL at 05:19

## 2020-06-23 RX ADMIN — ONDANSETRON 4 MG: 2 INJECTION INTRAMUSCULAR; INTRAVENOUS at 17:44

## 2020-06-23 RX ADMIN — MORPHINE SULFATE 4 MG: 4 INJECTION INTRAVENOUS at 06:46

## 2020-06-23 RX ADMIN — DIPHENHYDRAMINE HYDROCHLORIDE 25 MG: 50 INJECTION INTRAMUSCULAR; INTRAVENOUS at 08:11

## 2020-06-23 RX ADMIN — SODIUM CHLORIDE, POTASSIUM CHLORIDE, SODIUM LACTATE AND CALCIUM CHLORIDE 1000 ML: 600; 310; 30; 20 INJECTION, SOLUTION INTRAVENOUS at 07:10

## 2020-06-23 RX ADMIN — KETOROLAC TROMETHAMINE 30 MG: 30 INJECTION, SOLUTION INTRAMUSCULAR at 20:19

## 2020-06-23 RX ADMIN — DIPHENHYDRAMINE HYDROCHLORIDE 25 MG: 50 INJECTION INTRAMUSCULAR; INTRAVENOUS at 15:35

## 2020-06-23 NOTE — PROGRESS NOTES
Assessment completed. WDL. Vital signs stable.Patient claims to have good pain relief with prn pain medications.  Pt denies any other issues at this time. Pt encouraged to call for any needs.

## 2020-06-23 NOTE — PROGRESS NOTES
"Assumed care of patient, report from PATSY Butcher. Patient assessment complete, IVMF infusing per protocol, garcia catheter in place, pulse oximetry monitoring in place per protocol. Call light within reach, brother at bedside, encouraged to call for assistance when needed.    0840- Spoke with Dr. Waldrop regarding patients persistent N/V and pain.  Order received for phenergan suppository 12.5mg Q6H.     0845- Spoke with Dr. Thaddeus Jesus regarding patient condition. Patient with persistent N/V and pain.  Per Dr. Thaddeus Marcial remove garcia and order received for abdominal binder. Dr. Thaddeus Jesus to round on patient around noon.  Will continue to monitor.     1200- Patient continues to to have nausea and vomiting.  Refuses to ambulate at this time states \"I have no energy.\"  Will continue to monitor.     1240- Patient able to stand at bedside. RN and CNA assist, patient reports feeling dizzy and light headed, abdominal binder placed and phenergan suppository placed.    1550- While in room administering medication O2 saturation dropping to 87-88% while patient sleeping, RN placed patient on 0.5L NC, patient O2 >90%. Will continue to monitor.     1620- Page in to Dr. Thaddeus Jesus to update about patient condition and lab results. Awaiting return page.     1650- Spoke with Dr. Thaddeus Jesus regarding patients CBC and CMP results.  Updated  on patient condition.  Order received to continue with IV Toradol Q6H, change IVMF to 0.9%NaCl with KCl 20mEq @ 100ml/hr, repeat CBC and CMP in AM.   "

## 2020-06-23 NOTE — PROGRESS NOTES
POD# 1 S/P Right ovarian cystectomy, dermoid; LO cyst drainage     S:  Pain medication: Adequate.  Flatus: Positive   Voiding: indwelling catheter   PO intake: Not tolerating   Ambulating: feels very weak to ambulate  (+) nausea and vomiting - chronic condition   O:  Pulse: 69  Blood Pressure: 140/70     Temp  Av.8 °C (98.2 °F)  Min: 36.1 °C (97 °F)  Max: 37.7 °C (99.8 °F)  Heart: regular rate and rhythm without gallops or murmurs  Lungs: clear bases  Abdomen: flat and soft/nontender / bowelsounds present / incision clean and dry.  CVA: nontender  Extremities: non-tender  Catheter: indwelling  adequate output/clear    Intake/Output Summary (Last 24 hours) at 2020 1242  Last data filed at 2020 1200  Gross per 24 hour   Intake 1100 ml   Output 2275 ml   Net -1175 ml     A:  POD# 1 S/P Right Ovarian cystectomy, dermoid; LO cyst drainage    Intractable N/V - chronic condition for pt, ppted now by post op conditiong, pain meds  P:  ambulate  Phenergan FL  scopolomine patch  Abdominal binder  Change LRS to IV NS  CBC, CMP    MIROSLAVA COLORADO MD

## 2020-06-23 NOTE — PROGRESS NOTES
2330- Tired to get pt out of bed or to the edge of the bed but pt reported that she was too dizzy to try to get up.    0400- Tired to get pt out of bed but pt was vomiting at this time and unable to get out of bed or to the edge of the bed at this time.

## 2020-06-23 NOTE — CARE PLAN
Problem: Safety  Goal: Will remain free from injury  Outcome: PROGRESSING AS EXPECTED   Patient re-educated to call for assistance prior to getting out of bed when needed.  Call light within reach, patient states understanding.     Problem: Infection  Goal: Will remain free from infection  Outcome: PROGRESSING AS EXPECTED   VSS, no s/s of infection, will continue to monitor.

## 2020-06-23 NOTE — ANESTHESIA POSTPROCEDURE EVALUATION
Patient: Julianna Johnson    Procedure Summary     Date:  06/22/20 Room / Location:  [facility] ROOM 23 / SURGERY SAME DAY [facility]    Anesthesia Start:  1429 Anesthesia Stop:  1616    Procedures:       MINILAPAROTOMY (N/A Abdomen)      EXCISION, CYST, OVARY (Right Abdomen) Diagnosis:  (DERMOID CYST, RIGHT OVARY)    Surgeon:  Mary Jesus M.D. Responsible Provider:  Malik Patrick M.D.    Anesthesia Type:  general ASA Status:  2          Final Anesthesia Type: general  Last vitals  BP   Blood Pressure: 140/70    Temp   37.3 °C (99.2 °F)    Pulse   Pulse: 69   Resp   20    SpO2   99 %      Anesthesia Post Evaluation    Patient location during evaluation: PACU  Patient participation: complete - patient participated  Level of consciousness: awake and alert    Airway patency: patent  Anesthetic complications: no  Cardiovascular status: hemodynamically stable  Respiratory status: acceptable  Hydration status: euvolemic    PONV: none           Nurse Pain Score: 5 (NPRS)

## 2020-06-23 NOTE — PROGRESS NOTES
Called  to report that pt's urine output has decreased and pt's output is a total of 100 ml since 0415 am, report that pt is no longer on LR fluids. Also, reported that pt has been vomiting throughout the night but since 0415 am pt has vomited a total of 475 ml even after providing all nausea and vomiting medication that are ordered. Also, reported that pt is tachy and HR is at 108. MD ordered to give LR at 100ml/hr and to give Zofran at this time. No new orders given at this time.

## 2020-06-23 NOTE — PROGRESS NOTES
Pt brought to S350 by transport AT 1845. Report received from Na, in PACU at 1745.  Pt in bed resting comfortably. Orders released. 1850, report given to PATSY Butcher.

## 2020-06-23 NOTE — OP REPORT
DATE OF SERVICE:  06/22/2020    PREOPERATIVE DIAGNOSES:  1.  Right dermoid cyst.  2.  Pelvic pain.    POSTOPERATIVE DIAGNOSES:  1.  Right dermoid cyst.  2.  Pelvic pain.  3.  Left ovarian cyst.    PROCEDURES DONE:  1.  Mini laparotomy.  2.  Right ovary cystectomy, removal of dermoid cyst and left ovarian cyst   drainage.    SURGEON:  Mary Jesus MD    ANESTHESIOLOGIST:  Malik Patrick MD    ANESTHESIA:  General anesthesia.    SPECIMEN REMOVED:  Right dermoid cyst.    ESTIMATED BLOOD LOSS:  Less than 50 mL.    FINDINGS:  Right ovary cystically enlarged to 6 to 8 cm that is dermoid.  Left   ovary is cystic with clear fluid.  Bilateral fallopian tubes are normal.    Uterus is normal.    COMPLICATIONS:  None.    PATIENT CONDITION:  To recovery room in stable.    DESCRIPTION OF PROCEDURE:  The patient and family were discussed about the   risks, indications, benefits, alternatives of the procedure and she was   agreeable to proceed.  Signed informed consents.    The patient was then taken to the operating room where she was placed in a   supine position.  She was administered general anesthesia without difficulty.   The patient was then prepped and draped in the normal usual sterile fashion.   Hernandez catheter was placed to drain the bladder of clear urine.  Formal   timeout was done and preoperative antibiotics were given.  Mini laparotomy   (Pfannenstiel incision) was made 2 fingerbreadths above the pubic bone.  Incision was carried down to the underlying layer of fascia.  Fascial incision  was extended bilaterally sharply.  Inferior and superior aspects of the   fascia was grasped with Kochers, elevated, tented up, and dissected off   bluntly from the underlying rectus muscle.  Rectus muscle was  in the  midline and peritoneum was entered sharply and extended cephalocaudally.    Good visualization of the pelvic cavity was achieved after moist laps were placed to pack   away the bowels.  The  patient was placed in Trendelenburg position.    Raphael O was placed into the incision for retraction.  Right dermoid cyst was   delivered through the incision.  The right ovarian cyst was incised at the base where   normal ovarian tissue was visualized. Dermoid cyst capsule was enucleated from the remaining  normal ovary bluntly.  The cyst was removed intact.  It was sent to pathology.  The bed of  the retained normal ovarian tissue was cauterized and excellent hemostasis achieved.   A mattress suture using Vicryl 3-0 was done for hemostasis.  The   ovarian edges was reapproximated in a simple continuous running fashion with   excellent hemostasis.  The left ovary was cystic and clear cyst fluid was drained from it   with excellent hemostasis.  Copious irrigation was done.  Akua powder was   placed in the right ovary.  Procedure was then terminated.  All lap, sponges   and needles and instruments were removed.  Peritoneum was repaired using   Vicryl 3-0 in a continuous running fashion.  Rectus fascia was repaired in the   midline.  Fascial incision was repaired using Vicryl 0 in continuous running   fashion and subcutaneous tissue was reapproximated.  Skin was closed with a   4-0 subcuticular stitches.  Prineo dressing was applied.  The patient   tolerated the procedure well.  There were no intraoperative complications   noted and patient was sent to the PACU awake and in good condition.       ____________________________________     Mary Jesus MD MS / CRYSTAL    DD:  06/22/2020 16:58:57  DT:  06/22/2020 17:41:23    D#:  [id number]  Job#:  [id number]

## 2020-06-23 NOTE — CARE PLAN
Problem: Infection  Goal: Will remain free from infection  Outcome: PROGRESSING AS EXPECTED  Note: Patient is afebrile. Incision approximated. No redness or drainage noted. No signs and symptoms of infection noted.

## 2020-06-24 ENCOUNTER — APPOINTMENT (OUTPATIENT)
Dept: RADIOLOGY | Facility: MEDICAL CENTER | Age: 20
DRG: 743 | End: 2020-06-24
Attending: HOSPITALIST
Payer: COMMERCIAL

## 2020-06-24 LAB
ALBUMIN SERPL BCP-MCNC: 3.9 G/DL (ref 3.2–4.9)
ALBUMIN/GLOB SERPL: 1.2 G/DL
ALP SERPL-CCNC: 64 U/L (ref 30–99)
ALT SERPL-CCNC: 14 U/L (ref 2–50)
ANION GAP SERPL CALC-SCNC: 14 MMOL/L (ref 7–16)
AST SERPL-CCNC: 18 U/L (ref 12–45)
BASOPHILS # BLD AUTO: 0 % (ref 0–1.8)
BASOPHILS # BLD: 0 K/UL (ref 0–0.12)
BILIRUB SERPL-MCNC: 0.4 MG/DL (ref 0.1–1.5)
BUN SERPL-MCNC: 14 MG/DL (ref 8–22)
CALCIUM SERPL-MCNC: 8.7 MG/DL (ref 8.5–10.5)
CHLORIDE SERPL-SCNC: 104 MMOL/L (ref 96–112)
CO2 SERPL-SCNC: 22 MMOL/L (ref 20–33)
CREAT SERPL-MCNC: 0.48 MG/DL (ref 0.5–1.4)
EOSINOPHIL # BLD AUTO: 0.35 K/UL (ref 0–0.51)
EOSINOPHIL NFR BLD: 1.7 % (ref 0–6.9)
ERYTHROCYTE [DISTWIDTH] IN BLOOD BY AUTOMATED COUNT: 41.4 FL (ref 35.9–50)
GLOBULIN SER CALC-MCNC: 3.3 G/DL (ref 1.9–3.5)
GLUCOSE SERPL-MCNC: 145 MG/DL (ref 65–99)
HCT VFR BLD AUTO: 38.5 % (ref 37–47)
HGB BLD-MCNC: 12.4 G/DL (ref 12–16)
LYMPHOCYTES # BLD AUTO: 0.88 K/UL (ref 1–4.8)
LYMPHOCYTES NFR BLD: 4.3 % (ref 22–41)
MANUAL DIFF BLD: NORMAL
MCH RBC QN AUTO: 26.8 PG (ref 27–33)
MCHC RBC AUTO-ENTMCNC: 32.2 G/DL (ref 33.6–35)
MCV RBC AUTO: 83.2 FL (ref 81.4–97.8)
MONOCYTES # BLD AUTO: 1.06 K/UL (ref 0–0.85)
MONOCYTES NFR BLD AUTO: 5.2 % (ref 0–13.4)
MORPHOLOGY BLD-IMP: NORMAL
NEUTROPHILS # BLD AUTO: 18.12 K/UL (ref 2–7.15)
NEUTROPHILS NFR BLD: 88.8 % (ref 44–72)
NRBC # BLD AUTO: 0 K/UL
NRBC BLD-RTO: 0 /100 WBC
PLATELET # BLD AUTO: 304 K/UL (ref 164–446)
PLATELET BLD QL SMEAR: NORMAL
PMV BLD AUTO: 9.8 FL (ref 9–12.9)
POTASSIUM SERPL-SCNC: 4 MMOL/L (ref 3.6–5.5)
PROT SERPL-MCNC: 7.2 G/DL (ref 6–8.2)
RBC # BLD AUTO: 4.63 M/UL (ref 4.2–5.4)
RBC BLD AUTO: NORMAL
SODIUM SERPL-SCNC: 140 MMOL/L (ref 135–145)
WBC # BLD AUTO: 20.4 K/UL (ref 4.8–10.8)

## 2020-06-24 PROCEDURE — A9270 NON-COVERED ITEM OR SERVICE: HCPCS | Performed by: OBSTETRICS & GYNECOLOGY

## 2020-06-24 PROCEDURE — 85007 BL SMEAR W/DIFF WBC COUNT: CPT

## 2020-06-24 PROCEDURE — 770006 HCHG ROOM/CARE - MED/SURG/GYN SEMI*

## 2020-06-24 PROCEDURE — 700102 HCHG RX REV CODE 250 W/ 637 OVERRIDE(OP): Performed by: OBSTETRICS & GYNECOLOGY

## 2020-06-24 PROCEDURE — 74176 CT ABD & PELVIS W/O CONTRAST: CPT

## 2020-06-24 PROCEDURE — C9113 INJ PANTOPRAZOLE SODIUM, VIA: HCPCS | Performed by: OBSTETRICS & GYNECOLOGY

## 2020-06-24 PROCEDURE — 51798 US URINE CAPACITY MEASURE: CPT

## 2020-06-24 PROCEDURE — 36415 COLL VENOUS BLD VENIPUNCTURE: CPT

## 2020-06-24 PROCEDURE — 85027 COMPLETE CBC AUTOMATED: CPT

## 2020-06-24 PROCEDURE — 80053 COMPREHEN METABOLIC PANEL: CPT

## 2020-06-24 PROCEDURE — 700111 HCHG RX REV CODE 636 W/ 250 OVERRIDE (IP): Performed by: HOSPITALIST

## 2020-06-24 PROCEDURE — 700101 HCHG RX REV CODE 250: Performed by: OBSTETRICS & GYNECOLOGY

## 2020-06-24 PROCEDURE — 700112 HCHG RX REV CODE 229: Performed by: OBSTETRICS & GYNECOLOGY

## 2020-06-24 PROCEDURE — 700111 HCHG RX REV CODE 636 W/ 250 OVERRIDE (IP): Performed by: OBSTETRICS & GYNECOLOGY

## 2020-06-24 PROCEDURE — 99254 IP/OBS CNSLTJ NEW/EST MOD 60: CPT | Performed by: HOSPITALIST

## 2020-06-24 RX ORDER — DEXAMETHASONE SODIUM PHOSPHATE 4 MG/ML
4 INJECTION, SOLUTION INTRA-ARTICULAR; INTRALESIONAL; INTRAMUSCULAR; INTRAVENOUS; SOFT TISSUE ONCE
Status: COMPLETED | OUTPATIENT
Start: 2020-06-24 | End: 2020-06-24

## 2020-06-24 RX ORDER — PANTOPRAZOLE SODIUM 40 MG/10ML
40 INJECTION, POWDER, LYOPHILIZED, FOR SOLUTION INTRAVENOUS 2 TIMES DAILY
Status: DISCONTINUED | OUTPATIENT
Start: 2020-06-24 | End: 2020-06-25 | Stop reason: HOSPADM

## 2020-06-24 RX ORDER — PROCHLORPERAZINE EDISYLATE 5 MG/ML
10 INJECTION INTRAMUSCULAR; INTRAVENOUS EVERY 6 HOURS PRN
Status: DISCONTINUED | OUTPATIENT
Start: 2020-06-24 | End: 2020-06-25 | Stop reason: HOSPADM

## 2020-06-24 RX ADMIN — SENNOSIDES AND DOCUSATE SODIUM 1 TABLET: 8.6; 5 TABLET ORAL at 22:50

## 2020-06-24 RX ADMIN — DIPHENHYDRAMINE HYDROCHLORIDE 25 MG: 50 INJECTION INTRAMUSCULAR; INTRAVENOUS at 15:11

## 2020-06-24 RX ADMIN — PROCHLORPERAZINE EDISYLATE 10 MG: 5 INJECTION INTRAMUSCULAR; INTRAVENOUS at 15:47

## 2020-06-24 RX ADMIN — ACETAMINOPHEN 650 MG: 325 TABLET, FILM COATED ORAL at 02:26

## 2020-06-24 RX ADMIN — PANTOPRAZOLE SODIUM 40 MG: 40 INJECTION, POWDER, LYOPHILIZED, FOR SOLUTION INTRAVENOUS at 17:39

## 2020-06-24 RX ADMIN — ONDANSETRON 4 MG: 2 INJECTION INTRAMUSCULAR; INTRAVENOUS at 17:40

## 2020-06-24 RX ADMIN — ONDANSETRON 4 MG: 2 INJECTION INTRAMUSCULAR; INTRAVENOUS at 10:29

## 2020-06-24 RX ADMIN — POTASSIUM CHLORIDE AND SODIUM CHLORIDE 1000 ML: 900; 150 INJECTION, SOLUTION INTRAVENOUS at 03:35

## 2020-06-24 RX ADMIN — DIPHENHYDRAMINE HYDROCHLORIDE 25 MG: 50 INJECTION INTRAMUSCULAR; INTRAVENOUS at 07:59

## 2020-06-24 RX ADMIN — PANTOPRAZOLE SODIUM 40 MG: 40 INJECTION, POWDER, LYOPHILIZED, FOR SOLUTION INTRAVENOUS at 10:08

## 2020-06-24 RX ADMIN — ONDANSETRON 4 MG: 2 INJECTION INTRAMUSCULAR; INTRAVENOUS at 01:52

## 2020-06-24 RX ADMIN — ONDANSETRON 4 MG: 2 INJECTION INTRAMUSCULAR; INTRAVENOUS at 06:00

## 2020-06-24 RX ADMIN — ONDANSETRON 4 MG: 2 INJECTION INTRAMUSCULAR; INTRAVENOUS at 14:35

## 2020-06-24 RX ADMIN — DEXAMETHASONE SODIUM PHOSPHATE 4 MG: 4 INJECTION, SOLUTION INTRA-ARTICULAR; INTRALESIONAL; INTRAMUSCULAR; INTRAVENOUS; SOFT TISSUE at 10:08

## 2020-06-24 RX ADMIN — POTASSIUM CHLORIDE AND SODIUM CHLORIDE: 900; 150 INJECTION, SOLUTION INTRAVENOUS at 16:19

## 2020-06-24 RX ADMIN — PROMETHAZINE HYDROCHLORIDE 12.5 MG: 12.5 SUPPOSITORY RECTAL at 00:13

## 2020-06-24 RX ADMIN — DOCUSATE SODIUM 100 MG: 100 CAPSULE, LIQUID FILLED ORAL at 06:00

## 2020-06-24 RX ADMIN — PROMETHAZINE HYDROCHLORIDE 12.5 MG: 12.5 SUPPOSITORY RECTAL at 06:55

## 2020-06-24 ASSESSMENT — ENCOUNTER SYMPTOMS
DIZZINESS: 0
CHILLS: 0
COUGH: 0
ORTHOPNEA: 0
SPUTUM PRODUCTION: 0
HEMOPTYSIS: 0
PALPITATIONS: 0
VOMITING: 1
NAUSEA: 1

## 2020-06-24 NOTE — PROGRESS NOTES
MD tamara tang called with updates. Transfer order received for medical floor. MD Muñoz notified of transfer order. MD Muñoz given MD tamara tang phone number

## 2020-06-24 NOTE — CARE PLAN
Problem: Communication  Goal: The ability to communicate needs accurately and effectively will improve  Outcome: PROGRESSING SLOWER THAN EXPECTED  Note: Plan of care discussed with use of  with Hospitalist      Problem: Safety  Goal: Will remain free from injury  Outcome: PROGRESSING SLOWER THAN EXPECTED  Note: Discussed importance of calling out when ambulating  Discussed pt concerns with hospitalist. New medications ordered this shift. Will continue to monitor.

## 2020-06-24 NOTE — PROGRESS NOTES
Discussed with Dr. Clotilde mancia patient is 19 years old female came for elective minilaparotomy with right ovary cystectomy, removal of dermoid cyst and left ovarian cyst drain, patient has uncontrolled nausea and vomiting, patient is been getting Zofran, Decadron, scopolamine patch without much improvement, hospitalist group has been consulted to help managing her nausea and vomiting, patient has history of chronic nausea and vomiting she is on Zofran as outpatient she has been evaluated by GI in the past, vital signs and lab work this morning seems to be stable.  Dr. Muñoz will be consulting, he has been notified and agreed.

## 2020-06-24 NOTE — ASSESSMENT & PLAN NOTE
Patient has a history of difficult to control nausea  Now with intractable postop nausea/vomiting  Differential diagnosis would include exacerbation of chronic nausea vomiting syndrome versus ileus or obstruction  Patient is responding well to Zofran, Phenergan, Benadryl combination  Continue IV fluids, follow serial labs  Consider trial of compazine  Patient has had at least 2 CT scans done in our system that did not reveal the cause of nausea vomiting  She is also reportedly had extensive work-up in the [facility]    The patient is postoperative with symptoms uncontrolled, I discussed with gyn, will order CT scan to rule out obstruction or ileus

## 2020-06-24 NOTE — PROGRESS NOTES
This RN consulted charge nurse to assess pt emesis. Charge RN encouraged this RN to notify MD of emesis and limited urine output. MD notified of pt emesis and limited urine output. New medication orders received. See epic. MD stated that she would consult hospitalist for further assessment. Will continue to monitor

## 2020-06-24 NOTE — PROGRESS NOTES
Up to BR with assistance, patient unable to void after 6 hrs. Back to bed no dizziness  Noted.   0630- Bladder scan per telephone order of Dr. Thaddeus Hernandez. Straight cath if residual > 200 ml. Per Dr. Travis. Mary give Phenergan and Zofran around the clock.

## 2020-06-24 NOTE — PROGRESS NOTES
Trevon Jesus  at bedside to assess. No new orders at this time. MD stated advance diet as tolerated. Will continue to assess

## 2020-06-24 NOTE — CONSULTS
Hospital Medicine Consultation    Date of Service  6/24/2020    Referring Physician  Mary Caban*    Consulting Physician  Lul Muñoz M.D.    Reason for Consultation  Intractable nausea and vomiting    History of Presenting Illness  19 y.o. female who presented 6/22/2020 with pelvic pain, she was admitted for elective resection of a dermoid cyst.    I have reviewed some of the recent provider documentation available to me in the patient's medical chart.  Records are briefly summarized: Ms Harris has issues with recurrent nausea vomiting.  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  She was eventually discharged home.    Patient is seen in the postpartum unit, she received Phenergan and Benadryl earlier today and is quite sleepy.  When she awakened she says that she is not nauseated and denies abdominal pain.  She then falls back asleep.  Using iPad  I interviewed the patient's father at the bedside.  He indicates that the patient has had some trouble with nausea vomiting for at least 5 years.  She had extensive work-up in the [facility] and it sounds like she probably even underwent EGD there.  He is aware that she takes medication for her nausea frequently.    Patient underwent mini laparotomy and resection of dermoid cyst 2 days ago, since that time she has had near intractable nausea vomiting and essentially vomits every time she wakes up.  Zofran has not been effective in controlling her symptoms, she received benadryl and Phenergan this morning and finally went to sleep.  She is having liquid bowel movements without any signs of bleeding.      Review of Systems  Review of Systems   Constitutional: Negative for chills and malaise/fatigue.   Respiratory: Negative for cough, hemoptysis and sputum production.    Cardiovascular: Negative for chest pain, palpitations and orthopnea.    Gastrointestinal: Positive for nausea and vomiting.   Skin: Negative for itching and rash.   Neurological: Negative for dizziness.   All other systems reviewed and are negative.      Past Medical History   has no past medical history on file.    Surgical History   has a past surgical history that includes pr exploratory of abdomen (N/A, 6/22/2020) and ovarian cystectomy (Right, 6/22/2020).    Family History  Cousin with ovarian cysts  No known family history of specific GI disease    Social History   reports that she has never smoked. She has never used smokeless tobacco. She reports previous alcohol use. She reports previous drug use.    Medications  Prior to Admission Medications   Prescriptions Last Dose Informant Patient Reported? Taking?   omeprazole (PRILOSEC) 40 MG delayed-release capsule 6/21/2020 Patient No No   Sig: Take 1 Cap by mouth every day.   ondansetron (ZOFRAN ODT) 4 MG TABLET DISPERSIBLE 6/21/2020 at Unknown time Patient No No   Sig: Take 1 Tab by mouth every four hours as needed for Nausea (give PO if no IV route available).      Facility-Administered Medications: None       Allergies  No Known Allergies    Physical Exam  Temp:  [36.6 °C (97.9 °F)-37.8 °C (100.1 °F)] 36.6 °C (97.9 °F)  Pulse:  [] 87  Resp:  [16-20] 17  BP: ()/(56-84) 115/64  SpO2:  [95 %-100 %] 95 %    Physical Exam  Constitutional:       General: She is not in acute distress.     Appearance: Normal appearance. She is normal weight.   HENT:      Head: Normocephalic and atraumatic.      Right Ear: External ear normal.      Left Ear: External ear normal.      Nose: Nose normal.      Mouth/Throat:      Mouth: Mucous membranes are moist.      Pharynx: Oropharynx is clear.   Eyes:      Extraocular Movements: Extraocular movements intact.      Conjunctiva/sclera: Conjunctivae normal.      Pupils: Pupils are equal, round, and reactive to light.   Neck:      Musculoskeletal: Normal range of motion and neck supple.    Cardiovascular:      Rate and Rhythm: Normal rate and regular rhythm.      Pulses: Normal pulses.   Pulmonary:      Effort: Pulmonary effort is normal.      Breath sounds: Normal breath sounds.   Abdominal:      General: Abdomen is flat. Bowel sounds are normal.      Palpations: Abdomen is soft.   Musculoskeletal: Normal range of motion.   Skin:     General: Skin is warm and dry.      Capillary Refill: Capillary refill takes less than 2 seconds.      Coloration: Skin is not jaundiced.   Neurological:      General: No focal deficit present.      Mental Status: She is alert and oriented to person, place, and time.      Cranial Nerves: No cranial nerve deficit.      Gait: Gait normal.   Psychiatric:         Mood and Affect: Mood normal.         Behavior: Behavior normal.         Fluids      Laboratory  Recent Labs     06/23/20  1514 06/24/20  0327   WBC 19.0* 20.4*   RBC 4.53 4.63   HEMOGLOBIN 12.3 12.4   HEMATOCRIT 36.7* 38.5   MCV 81.0* 83.2   MCH 27.2 26.8*   MCHC 33.5* 32.2*   RDW 39.0 41.4   PLATELETCT 302 304   MPV 9.6 9.8     Recent Labs     06/23/20  1514 06/24/20  0327   SODIUM 139 140   POTASSIUM 3.5* 4.0   CHLORIDE 103 104   CO2 21 22   GLUCOSE 138* 145*   BUN 13 14   CREATININE 0.53 0.48*   CALCIUM 8.9 8.7                     Imaging  No orders to display       Assessment/Plan  * Intractable vomiting with nausea- (present on admission)  Assessment & Plan  Patient has a history of difficult to control nausea  Now with intractable postop nausea/vomiting  Differential diagnosis would include exacerbation of chronic nausea vomiting syndrome versus ileus or obstruction  Patient is responding well to Zofran, Phenergan, Benadryl combination  Continue IV fluids, follow serial labs  Consider trial of compazine  Patient has had at least 2 CT scans done in our system that did not reveal the cause of nausea vomiting  She is also reportedly had extensive work-up in the [facility]    The patient is postoperative with  symptoms uncontrolled, I discussed with gyn, will order CT scan to rule out obstruction or ileus    Leukocytosis- (present on admission)  Assessment & Plan  Repeat labs in the morning    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

## 2020-06-24 NOTE — PROGRESS NOTES
1900- Bedside report done, patient in bed with ongoing IVF of 0.9 Nacl with Kcl 20 meq. @ 100 ml/hr infusing well into left hand. Will continue to monitor.   1930- Vomited 300 ml. Phenergan suppository given per rectum.   2005- Called Dr. Thaddeus Hernandez, for patient still vomiting after giving phenergan, with telephone order to give decadron IV and to change Phenergan 12.5 mg Q 6 to Q 4 hr. PRN for nausea and vomiting. Will continue to monitor.

## 2020-06-24 NOTE — PROGRESS NOTES
Up to BR with assistance. Ambulating well with steady gait. Perineal care rendered and back to bed comfortably. No dizziness and nausea/vomiting noted at this time. Will continue to monitor.

## 2020-06-24 NOTE — PROGRESS NOTES
MD thaddeus hernandez notified of pt continued emes. And new orders from Dr. Muñoz. Clarified IV orders with Thaddeus Hernandez. Continue NS w 20 of k

## 2020-06-24 NOTE — PROGRESS NOTES
Toni Hospitalist at bedside to assess pt.  used by MD. MD stated to keep pt on clear liquid diet. And that he would reassess around 1400.No new orders at this time

## 2020-06-24 NOTE — PROGRESS NOTES
GYN NOTES     POD# 2 S/P right dermoid cystectomy; LO cyst drainage   Intractable N/V    S:  Pt is sleeping after Benadryl intake  Still not able to tolerate PO    O:  Pulse: 87  Blood Pressure: 115/64     Temp  Av.2 °C (99 °F)  Min: 36.6 °C (97.9 °F)  Max: 37.8 °C (100.1 °F)  Heart: regular rate and rhythm without gallops or murmurs  Lungs: clear bases  Abdomen: flat and soft/nontender / bowelsounds present / incision clean and dry.  CVA: nontender  Extremities: non-tender    Intake/Output Summary (Last 24 hours) at 2020 1247  Last data filed at 2020 0300  Gross per 24 hour   Intake --   Output 1900 ml   Net -1900 ml     A:  POD# 2 S/P Right Dermoid Cystectomy, LO cyst drainage   Intractable N/V - meds: RTC Zofran and Phenergan [place]; Scop patch and Benadryl  Decadron given PRN, Pantoprazole IV  Pt with history of chronic N/V since age 15. Work-ups done, including GI - as per her GM - were normal.     P:  Hospitalist consult done - very much appreciated. awaiting input   Continue with current management.     MIROSLAVA COLORADO MD

## 2020-06-24 NOTE — PROGRESS NOTES
MD Muñoz at bedside to assess. No new emesis at this time. Pt tolerating clear liquid diet. Pt up to bathroom. See epic for amount. No new orders at this time.

## 2020-06-25 VITALS
SYSTOLIC BLOOD PRESSURE: 116 MMHG | HEART RATE: 57 BPM | RESPIRATION RATE: 20 BRPM | TEMPERATURE: 97.9 F | DIASTOLIC BLOOD PRESSURE: 55 MMHG | BODY MASS INDEX: 31.6 KG/M2 | HEIGHT: 66 IN | WEIGHT: 196.65 LBS | OXYGEN SATURATION: 96 %

## 2020-06-25 LAB
ANION GAP SERPL CALC-SCNC: 13 MMOL/L (ref 7–16)
BASOPHILS # BLD AUTO: 0.2 % (ref 0–1.8)
BASOPHILS # BLD: 0.03 K/UL (ref 0–0.12)
BUN SERPL-MCNC: 15 MG/DL (ref 8–22)
CALCIUM SERPL-MCNC: 8.4 MG/DL (ref 8.5–10.5)
CHLORIDE SERPL-SCNC: 103 MMOL/L (ref 96–112)
CO2 SERPL-SCNC: 18 MMOL/L (ref 20–33)
CREAT SERPL-MCNC: 0.52 MG/DL (ref 0.5–1.4)
EKG IMPRESSION: NORMAL
EOSINOPHIL # BLD AUTO: 0.02 K/UL (ref 0–0.51)
EOSINOPHIL NFR BLD: 0.1 % (ref 0–6.9)
ERYTHROCYTE [DISTWIDTH] IN BLOOD BY AUTOMATED COUNT: 40 FL (ref 35.9–50)
GLUCOSE BLD-MCNC: 98 MG/DL (ref 65–99)
GLUCOSE SERPL-MCNC: 105 MG/DL (ref 65–99)
HCT VFR BLD AUTO: 36.2 % (ref 37–47)
HGB BLD-MCNC: 11.9 G/DL (ref 12–16)
IMM GRANULOCYTES # BLD AUTO: 0.07 K/UL (ref 0–0.11)
IMM GRANULOCYTES NFR BLD AUTO: 0.5 % (ref 0–0.9)
LYMPHOCYTES # BLD AUTO: 2.53 K/UL (ref 1–4.8)
LYMPHOCYTES NFR BLD: 17 % (ref 22–41)
MCH RBC QN AUTO: 26.7 PG (ref 27–33)
MCHC RBC AUTO-ENTMCNC: 32.9 G/DL (ref 33.6–35)
MCV RBC AUTO: 81.3 FL (ref 81.4–97.8)
MONOCYTES # BLD AUTO: 1.13 K/UL (ref 0–0.85)
MONOCYTES NFR BLD AUTO: 7.6 % (ref 0–13.4)
NEUTROPHILS # BLD AUTO: 11.06 K/UL (ref 2–7.15)
NEUTROPHILS NFR BLD: 74.6 % (ref 44–72)
NRBC # BLD AUTO: 0 K/UL
NRBC BLD-RTO: 0 /100 WBC
PLATELET # BLD AUTO: 287 K/UL (ref 164–446)
PMV BLD AUTO: 9.8 FL (ref 9–12.9)
POTASSIUM SERPL-SCNC: 3.5 MMOL/L (ref 3.6–5.5)
RBC # BLD AUTO: 4.45 M/UL (ref 4.2–5.4)
SODIUM SERPL-SCNC: 134 MMOL/L (ref 135–145)
TROPONIN T SERPL-MCNC: <6 NG/L (ref 6–19)
WBC # BLD AUTO: 14.8 K/UL (ref 4.8–10.8)

## 2020-06-25 PROCEDURE — 36415 COLL VENOUS BLD VENIPUNCTURE: CPT

## 2020-06-25 PROCEDURE — 700101 HCHG RX REV CODE 250: Performed by: OBSTETRICS & GYNECOLOGY

## 2020-06-25 PROCEDURE — 82962 GLUCOSE BLOOD TEST: CPT

## 2020-06-25 PROCEDURE — 85025 COMPLETE CBC W/AUTO DIFF WBC: CPT

## 2020-06-25 PROCEDURE — 93010 ELECTROCARDIOGRAM REPORT: CPT | Performed by: INTERNAL MEDICINE

## 2020-06-25 PROCEDURE — 80048 BASIC METABOLIC PNL TOTAL CA: CPT

## 2020-06-25 PROCEDURE — 700111 HCHG RX REV CODE 636 W/ 250 OVERRIDE (IP): Performed by: OBSTETRICS & GYNECOLOGY

## 2020-06-25 PROCEDURE — 93005 ELECTROCARDIOGRAM TRACING: CPT | Performed by: HOSPITALIST

## 2020-06-25 PROCEDURE — 700112 HCHG RX REV CODE 229: Performed by: OBSTETRICS & GYNECOLOGY

## 2020-06-25 PROCEDURE — A9270 NON-COVERED ITEM OR SERVICE: HCPCS | Performed by: OBSTETRICS & GYNECOLOGY

## 2020-06-25 PROCEDURE — C9113 INJ PANTOPRAZOLE SODIUM, VIA: HCPCS | Performed by: OBSTETRICS & GYNECOLOGY

## 2020-06-25 PROCEDURE — 84484 ASSAY OF TROPONIN QUANT: CPT

## 2020-06-25 RX ORDER — ONDANSETRON 4 MG/1
4 TABLET, ORALLY DISINTEGRATING ORAL EVERY 4 HOURS PRN
Status: DISCONTINUED | OUTPATIENT
Start: 2020-06-25 | End: 2020-06-25 | Stop reason: HOSPADM

## 2020-06-25 RX ORDER — OMEPRAZOLE 40 MG/1
40 CAPSULE, DELAYED RELEASE ORAL DAILY
Qty: 30 CAP | Refills: 3 | Status: SHIPPED | OUTPATIENT
Start: 2020-06-25 | End: 2023-10-09

## 2020-06-25 RX ORDER — DIPHENHYDRAMINE HCL 25 MG
25 CAPSULE ORAL EVERY 6 HOURS PRN
Qty: 30 CAP | Refills: 3 | Status: SHIPPED | OUTPATIENT
Start: 2020-06-25 | End: 2023-10-09

## 2020-06-25 RX ORDER — OMEPRAZOLE 20 MG/1
20 CAPSULE, DELAYED RELEASE ORAL DAILY
Qty: 30 CAP | Refills: 3 | Status: SHIPPED | OUTPATIENT
Start: 2020-06-25 | End: 2023-10-09

## 2020-06-25 RX ORDER — ONDANSETRON 4 MG/1
4 TABLET, FILM COATED ORAL EVERY 4 HOURS PRN
Qty: 30 TAB | Refills: 3 | Status: SHIPPED | OUTPATIENT
Start: 2020-06-25 | End: 2023-10-09

## 2020-06-25 RX ADMIN — MORPHINE SULFATE 4 MG: 4 INJECTION INTRAVENOUS at 11:08

## 2020-06-25 RX ADMIN — DOCUSATE SODIUM 100 MG: 100 CAPSULE, LIQUID FILLED ORAL at 05:22

## 2020-06-25 RX ADMIN — ONDANSETRON 4 MG: 2 INJECTION INTRAMUSCULAR; INTRAVENOUS at 10:59

## 2020-06-25 RX ADMIN — POTASSIUM CHLORIDE AND SODIUM CHLORIDE: 900; 150 INJECTION, SOLUTION INTRAVENOUS at 15:30

## 2020-06-25 RX ADMIN — PANTOPRAZOLE SODIUM 40 MG: 40 INJECTION, POWDER, LYOPHILIZED, FOR SOLUTION INTRAVENOUS at 16:58

## 2020-06-25 RX ADMIN — DOCUSATE SODIUM 100 MG: 100 CAPSULE, LIQUID FILLED ORAL at 16:57

## 2020-06-25 RX ADMIN — PANTOPRAZOLE SODIUM 40 MG: 40 INJECTION, POWDER, LYOPHILIZED, FOR SOLUTION INTRAVENOUS at 05:21

## 2020-06-25 RX ADMIN — POTASSIUM CHLORIDE AND SODIUM CHLORIDE: 900; 150 INJECTION, SOLUTION INTRAVENOUS at 05:22

## 2020-06-25 ASSESSMENT — COGNITIVE AND FUNCTIONAL STATUS - GENERAL
SUGGESTED CMS G CODE MODIFIER MOBILITY: CH
MOBILITY SCORE: 24
DAILY ACTIVITIY SCORE: 24
SUGGESTED CMS G CODE MODIFIER DAILY ACTIVITY: CH

## 2020-06-25 ASSESSMENT — LIFESTYLE VARIABLES
ON A TYPICAL DAY WHEN YOU DRINK ALCOHOL HOW MANY DRINKS DO YOU HAVE: 0
HAVE PEOPLE ANNOYED YOU BY CRITICIZING YOUR DRINKING: NO
HAVE YOU EVER FELT YOU SHOULD CUT DOWN ON YOUR DRINKING: NO
AVERAGE NUMBER OF DAYS PER WEEK YOU HAVE A DRINK CONTAINING ALCOHOL: 0
TOTAL SCORE: 0
TOTAL SCORE: 0
EVER FELT BAD OR GUILTY ABOUT YOUR DRINKING: NO
DOES PATIENT WANT TO STOP DRINKING: CANNOT ASSESS
EVER HAD A DRINK FIRST THING IN THE MORNING TO STEADY YOUR NERVES TO GET RID OF A HANGOVER: NO
CONSUMPTION TOTAL: NEGATIVE
EVER_SMOKED: NEVER
TOTAL SCORE: 0
HOW MANY TIMES IN THE PAST YEAR HAVE YOU HAD 5 OR MORE DRINKS IN A DAY: 0
ALCOHOL_USE: NO

## 2020-06-25 NOTE — DISCHARGE PLANNING
Anticipated Discharge Disposition: Home    Action: Pt discussed during rounds. Dr. Muñoz does not anticipate any needs upon d/c.     Barriers to Discharge: None    Plan: LSW to assist as needed

## 2020-06-25 NOTE — PROGRESS NOTES
Assumed care of patient at 1900, report received from day RN, pt A&Ox4, R/A, a little sleepy but able to arouse, pt has eaten 50% of dinner with no episode of nausea or vomiting. Pt call bell in reach, bed in low and locked position, non skid socks on. Pt denies any pain or needs at this time. Will continue to round on patient.

## 2020-06-25 NOTE — PROGRESS NOTES
Pt oriented x 4 but fatigued and with flat affect. C/o nausea and dry heaving. C/o 10/10 mid chest/ upper abdominal pain. VSS. MD notified. Stat EKG completed. Sinus bradycardia. Morphine and zofran given with positive results. Pending troponin. MD updated on pt. No new orders received. Pt resting comfortably at this time. Call light within reach. Hourly rounding in place.

## 2020-06-25 NOTE — PROGRESS NOTES
Patient arrived to [address]. Awake and alert. Ambulated to bed with stand by assistance. Clear liquid tray with her. Abdominal binder on;

## 2020-06-25 NOTE — CARE PLAN
Problem: Safety  Goal: Will remain free from injury  Outcome: PROGRESSING AS EXPECTED  Goal: Will remain free from falls  Outcome: PROGRESSING AS EXPECTED     Problem: Infection  Goal: Will remain free from infection  Outcome: PROGRESSING AS EXPECTED     Problem: Bowel/Gastric:  Goal: Normal bowel function is maintained or improved  Outcome: PROGRESSING AS EXPECTED     Problem: Discharge Barriers/Planning  Goal: Patient's continuum of care needs will be met  Outcome: PROGRESSING AS EXPECTED     Problem: Pain Management  Goal: Pain level will decrease to patient's comfort goal  Outcome: PROGRESSING AS EXPECTED

## 2020-06-25 NOTE — CARE PLAN
Problem: Pain Management  Goal: Pain level will decrease to patient's comfort goal  Outcome: PROGRESSING SLOWER THAN EXPECTED  Note: Iv morphine in use for severe 10/10 pain. Tolerated well. Frequent pain assessment in place.      Problem: Psychosocial Needs:  Goal: Level of anxiety will decrease  Outcome: PROGRESSING SLOWER THAN EXPECTED  Note: Pt withdrawn and with flat affect.

## 2020-06-25 NOTE — DISCHARGE SUMMARY
Discharge Summary:      Julianna Johnson    Admit Date:   6/22/2020  Discharge Date:  6/25/2020     Admitting diagnosis:  DERMOID CYST, RIGHT OVARY  Dermoid cyst of ovary, right  Pelvic pain  Chronic N/V  Intractable N/V     History:  History reviewed. No pertinent past medical history.  OB History   No obstetric history on file.        Patient has no known allergies.  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 s/p right ovarian cystectomy, LO cyst drainage. Post-op complicated by N/V, intractable; moderate control on Zofran, Phenergan supp, Compazine, Scopolamine patch/benadryl and received PRN Decadron.  Hospitalist service consulted. CT scan was unremarkable.   Today patient claims that she is feeling a lot better, decrease in N/V, able to tolerate PO and desires to go home.      Vitals:    06/2000 06/25/20 0400 06/25/20 0705 06/25/20 1110   BP: 114/61 109/59 116/51 128/86   Pulse: 67 60 84 65   Resp: 16 16 18    Temp: 37.2 °C (99 °F) 37.2 °C (99 °F) 36.8 °C (98.3 °F) 37.2 °C (99 °F)   TempSrc: Temporal Temporal Temporal Temporal   SpO2: 98% 97% 94% 100%   Weight:       Height:           Current Facility-Administered Medications   Medication Dose   • ondansetron (ZOFRAN ODT) dispertab 4 mg  4 mg   • hyoscyamine-maalox plus-lidocaine viscous (GI COCKTAIL) oral susp 30 mL  30 mL   • pantoprazole (PROTONIX) injection 40 mg  40 mg   • prochlorperazine (COMPAZINE) injection 10 mg  10 mg   • NS infusion     • 0.9 % NaCl with KCl 20 mEq infusion     • ketorolac (TORADOL) injection 30 mg  30 mg   • promethazine (PHENERGAN) suppository 12.5 mg  12.5 mg   • acetaminophen (TYLENOL) tablet 650 mg  650 mg   • Pharmacy Consult Request  ...Pain Management Review 1 Each  1 Each   • ondansetron (ZOFRAN) syringe/vial injection 4 mg  4 mg   • diphenhydrAMINE (BENADRYL) injection 25 mg  25 mg   • haloperidol lactate (HALDOL) injection 1 mg  1 mg   • scopolamine (TRANSDERM-SCOP) patch 1 Patch  1 Patch   • docusate sodium (COLACE) capsule 100 mg  100 mg   • senna-docusate (PERICOLACE or SENOKOT S) 8.6-50 MG per tablet 1 Tab  1 Tab   • senna-docusate (PERICOLACE or SENOKOT S) 8.6-50 MG per tablet 1 Tab  1 Tab   • polyethylene glycol/lytes (MIRALAX) PACKET 1 Packet  1 Packet   • magnesium hydroxide (MILK OF MAGNESIA) suspension 30 mL  30 mL   • bisacodyl (DULCOLAX) suppository 10 mg  10 mg   • fleet enema 133 mL  1 Each   • oxyCODONE immediate-release (ROXICODONE) tablet 5 mg  5 mg   • oxyCODONE immediate release (ROXICODONE) tablet 10 mg  10 mg   • morphine (pf) 4 MG/ML injection 4 mg  4 mg       Exam:    Abdomen: Abdomen soft, non-tender. BS normal. No masses,  No organomegaly  Incision: dry and intact  Perineum: perineum intact  Extremity: extremities, peripheral pulses and reflexes normal     Labs:  Recent Labs     06/23/20  1514 06/24/20  0327 06/25/20  1047   WBC 19.0* 20.4* 14.8*   RBC 4.53 4.63 4.45   HEMOGLOBIN 12.3 12.4 11.9*   HEMATOCRIT 36.7* 38.5 36.2*   MCV 81.0* 83.2 81.3*   MCH 27.2 26.8* 26.7*   MCHC 33.5* 32.2* 32.9*   RDW 39.0 41.4 40.0   PLATELETCT 302 304 287   MPV 9.6 9.8 9.8        Activity:   Discharge to home  Pelvic Rest x 6 weeks    Assessment:  N/V, Chronic  S/p Right ovarian cystectomy, LO cyst drainage - stable    Follow up: 2 weeks incision check      Discharge Meds:   No current outpatient medications on file.       Mary Jesus M.D.

## 2020-06-26 NOTE — DISCHARGE INSTRUCTIONS
* Be sure to drink enough fluids to keep you urine light yellow and clear  * If nausea and vomiting is uncontrolled return to ER  * Follow up appointment scheduled for incision check in 2 weeks    Discharge Instructions    Discharged to home by car with relative. Discharged via wheelchair, hospital escort: Yes.  Special equipment needed: Not Applicable    Be sure to schedule a follow-up appointment with your primary care doctor or any specialists as instructed.     Discharge Plan:   Diet Plan: Discussed  Activity Level: Discussed  Confirmed Follow up Appointment: Patient to Call and Schedule Appointment  Confirmed Symptoms Management: Discussed  Medication Reconciliation Updated: Yes    I understand that a diet low in cholesterol, fat, and sodium is recommended for good health. Unless I have been given specific instructions below for another diet, I accept this instruction as my diet prescription.   Other diet: As tolerated     Special Instructions: None    · Is patient discharged on Warfarin / Coumadin?   No     Depression / Suicide Risk    As you are discharged from this [facility] facility, it is important to learn how to keep safe from harming yourself.    Recognize the warning signs:  · Abrupt changes in personality, positive or negative- including increase in energy   · Giving away possessions  · Change in eating patterns- significant weight changes-  positive or negative  · Change in sleeping patterns- unable to sleep or sleeping all the time   · Unwillingness or inability to communicate  · Depression  · Unusual sadness, discouragement and loneliness  · Talk of wanting to die  · Neglect of personal appearance   · Rebelliousness- reckless behavior  · Withdrawal from people/activities they love  · Confusion- inability to concentrate     If you or a loved one observes any of these behaviors or has concerns about self-harm, here's what you can do:  · Talk about it- your feelings and reasons for harming  yourself  · Remove any means that you might use to hurt yourself (examples: pills, rope, extension cords, firearm)  · Get professional help from the community (Mental Health, Substance Abuse, psychological counseling)  · Do not be alone:Call your Safe Contact- someone whom you trust who will be there for you.  · Call your local CRISIS HOTLINE 650-0947 or 669-713-9294  · Call your local Children's Mobile Crisis Response Team [place] (078) 316-4339 or www.Quintiq  · Call the toll free National Suicide Prevention Hotlines   · National Suicide Prevention Lifeline 202-552-UCPF (2763)  · National Hope Line Network 800-SUICIDE (996-5673)

## 2020-06-26 NOTE — PROGRESS NOTES
I was called yesterday for chest pain / anxiety  An EKG was ordered and reviewed, it was unremarkable  Troponin undetectable  Symptoms rapidly resolved with anti-emetics  Given age and risk factors, normal EKG, no further workup recommended  Discussed with Dr. Mabry, if patient tolerates PO, she can be discharged home from hospitalist perspective

## 2020-06-26 NOTE — PROGRESS NOTES
Pt tolerated some pasta per self report. Not visualized by this RN. Dr Muñoz notified and to bedside. Stated ok to discharge pt. PIV removed. Pt provided with all discharge instructions including home medications, follow up appointments and when to seek medical attention. All questions answered. Pt escorted off the unit via wheelchair with CNA and family for discharge home.

## 2020-09-21 ENCOUNTER — IMMUNIZATION (OUTPATIENT)
Dept: OCCUPATIONAL MEDICINE | Facility: CLINIC | Age: 20
End: 2020-09-21

## 2020-09-21 DIAGNOSIS — Z23 NEED FOR VACCINATION: ICD-10-CM

## 2020-09-21 PROCEDURE — 90686 IIV4 VACC NO PRSV 0.5 ML IM: CPT | Performed by: PREVENTIVE MEDICINE

## 2020-11-12 ENCOUNTER — HOSPITAL ENCOUNTER (OUTPATIENT)
Facility: MEDICAL CENTER | Age: 20
End: 2020-11-12
Attending: PREVENTIVE MEDICINE
Payer: COMMERCIAL

## 2020-11-12 ENCOUNTER — EH NON-PROVIDER (OUTPATIENT)
Dept: OCCUPATIONAL MEDICINE | Facility: CLINIC | Age: 20
End: 2020-11-12

## 2020-11-12 DIAGNOSIS — Z11.59 ENCOUNTER FOR SCREENING FOR OTHER VIRAL DISEASES: ICD-10-CM

## 2020-11-13 LAB — COVID ORDER STATUS COVID19: NORMAL

## 2020-11-14 LAB
SARS-COV-2 RNA RESP QL NAA+PROBE: NOTDETECTED
SPECIMEN SOURCE: NORMAL

## 2020-11-16 ENCOUNTER — TELEPHONE (OUTPATIENT)
Dept: OCCUPATIONAL MEDICINE | Facility: CLINIC | Age: 20
End: 2020-11-16

## 2020-11-16 NOTE — TELEPHONE ENCOUNTER
Notified pt. of NEGATIVE COVID-19 test. Advised pt. not to return to work and to call employee screening line at [phone] for further instructions.

## 2020-12-16 DIAGNOSIS — Z23 NEED FOR VACCINATION: ICD-10-CM

## 2020-12-29 ENCOUNTER — IMMUNIZATION (OUTPATIENT)
Dept: FAMILY PLANNING/WOMEN'S HEALTH CLINIC | Facility: IMMUNIZATION CENTER | Age: 20
End: 2020-12-29
Attending: FAMILY MEDICINE
Payer: COMMERCIAL

## 2020-12-29 DIAGNOSIS — Z23 NEED FOR VACCINATION: ICD-10-CM

## 2020-12-29 DIAGNOSIS — Z23 ENCOUNTER FOR VACCINATION: Primary | ICD-10-CM

## 2020-12-29 PROCEDURE — 91301 MODERNA SARS-COV-2 VACCINE: CPT

## 2020-12-29 PROCEDURE — 0011A MODERNA SARS-COV-2 VACCINE: CPT

## 2021-01-29 PROCEDURE — 0012A MODERNA SARS-COV-2 VACCINE: CPT

## 2021-01-29 PROCEDURE — 91301 MODERNA SARS-COV-2 VACCINE: CPT

## 2021-01-30 ENCOUNTER — IMMUNIZATION (OUTPATIENT)
Dept: FAMILY PLANNING/WOMEN'S HEALTH CLINIC | Facility: IMMUNIZATION CENTER | Age: 21
End: 2021-01-30
Payer: COMMERCIAL

## 2021-01-30 DIAGNOSIS — Z23 ENCOUNTER FOR VACCINATION: Primary | ICD-10-CM

## 2021-03-25 ENCOUNTER — EH NON-PROVIDER (OUTPATIENT)
Dept: OCCUPATIONAL MEDICINE | Facility: CLINIC | Age: 21
End: 2021-03-25

## 2021-03-25 DIAGNOSIS — Z01.89 RESPIRATORY CLEARANCE EXAMINATION, ENCOUNTER FOR: ICD-10-CM

## 2021-03-25 PROCEDURE — 94375 RESPIRATORY FLOW VOLUME LOOP: CPT | Performed by: PREVENTIVE MEDICINE

## 2021-06-13 ENCOUNTER — HOSPITAL ENCOUNTER (EMERGENCY)
Facility: MEDICAL CENTER | Age: 21
End: 2021-06-13
Attending: EMERGENCY MEDICINE
Payer: COMMERCIAL

## 2021-06-13 VITALS
BODY MASS INDEX: 31.5 KG/M2 | WEIGHT: 196 LBS | DIASTOLIC BLOOD PRESSURE: 68 MMHG | HEIGHT: 66 IN | TEMPERATURE: 97.7 F | SYSTOLIC BLOOD PRESSURE: 147 MMHG | HEART RATE: 81 BPM | OXYGEN SATURATION: 96 % | RESPIRATION RATE: 18 BRPM

## 2021-06-13 DIAGNOSIS — H81.10 BENIGN PAROXYSMAL POSITIONAL VERTIGO, UNSPECIFIED LATERALITY: ICD-10-CM

## 2021-06-13 LAB
EKG IMPRESSION: NORMAL
GLUCOSE BLD-MCNC: 93 MG/DL (ref 65–99)

## 2021-06-13 PROCEDURE — 93005 ELECTROCARDIOGRAM TRACING: CPT

## 2021-06-13 PROCEDURE — 82962 GLUCOSE BLOOD TEST: CPT

## 2021-06-13 PROCEDURE — 93005 ELECTROCARDIOGRAM TRACING: CPT | Performed by: EMERGENCY MEDICINE

## 2021-06-13 PROCEDURE — 96375 TX/PRO/DX INJ NEW DRUG ADDON: CPT

## 2021-06-13 PROCEDURE — 700111 HCHG RX REV CODE 636 W/ 250 OVERRIDE (IP): Performed by: EMERGENCY MEDICINE

## 2021-06-13 PROCEDURE — 96374 THER/PROPH/DIAG INJ IV PUSH: CPT

## 2021-06-13 PROCEDURE — 36415 COLL VENOUS BLD VENIPUNCTURE: CPT

## 2021-06-13 PROCEDURE — 99284 EMERGENCY DEPT VISIT MOD MDM: CPT

## 2021-06-13 RX ORDER — ONDANSETRON 2 MG/ML
4 INJECTION INTRAMUSCULAR; INTRAVENOUS ONCE
Status: COMPLETED | OUTPATIENT
Start: 2021-06-13 | End: 2021-06-13

## 2021-06-13 RX ORDER — LORAZEPAM 2 MG/ML
1 INJECTION INTRAMUSCULAR ONCE
Status: COMPLETED | OUTPATIENT
Start: 2021-06-13 | End: 2021-06-13

## 2021-06-13 RX ORDER — MECLIZINE HYDROCHLORIDE 25 MG/1
25 TABLET ORAL 3 TIMES DAILY PRN
Qty: 30 TABLET | Refills: 0 | Status: SHIPPED | OUTPATIENT
Start: 2021-06-13 | End: 2023-10-09

## 2021-06-13 RX ADMIN — ONDANSETRON 4 MG: 2 INJECTION INTRAMUSCULAR; INTRAVENOUS at 18:30

## 2021-06-13 RX ADMIN — LORAZEPAM 1 MG: 2 INJECTION INTRAMUSCULAR; INTRAVENOUS at 18:30

## 2021-06-13 ASSESSMENT — FIBROSIS 4 INDEX: FIB4 SCORE: 0.34

## 2021-06-14 NOTE — ED TRIAGE NOTES
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 has 2 episodes of vomiting. has blg of 93   • Head Ache     rates as 10/10     Aaox4. Neuro intact.

## 2021-06-14 NOTE — ED NOTES
Pt has been crying, vomiting more undigested food.  Family at bedside.  Medicated for nausea and anxiety.

## 2021-06-14 NOTE — ED NOTES
Reviewed discharge paperwork and prescription with pt. Pt verbalized understanding. VSS. Ambulatory. Family taking pt home.

## 2021-06-14 NOTE — ED PROVIDER NOTES
"ED Provider Note    ED Provider Note    Primary care provider: Davidson Simpson M.D.  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 has 2 episodes of vomiting. has blg of 93   • Head Ache     rates as 10/10     Seen at 6:05 PM.   HPI  Julianna Johnson is a 20 y.o. female  at this facility who presents to the Emergency Department with abrupt onset of dizziness.  She describes the dizziness as room spinning/vertigo, with associated vomiting.  This occurred abruptly when she was here at work.  She denies any recent fevers, chills, headache, neck pain, visual changes, chest pain, shortness of breath.  She did vomit twice.  She denies any numbness or focal weakness.  She denies any ear pain or tinnitus.    Moving the head or moving the eyes causes the symptoms to abruptly worsen, it is significantly improved with rest.    REVIEW OF SYSTEMS  See HPI,   Remainder of ROS negative.     PAST MEDICAL HISTORY   Denies    SURGICAL HISTORY   has a past surgical history that includes exploratory of abdomen (N/A, 6/22/2020) and ovarian cystectomy (Right, 6/22/2020).    SOCIAL HISTORY  Social History     Tobacco Use   • Smoking status: Never Smoker   • Smokeless tobacco: Never Used   Vaping Use   • Vaping Use: Never used   Substance Use Topics   • Alcohol use: Not Currently   • Drug use: Not Currently      Social History     Substance and Sexual Activity   Drug Use Not Currently       FAMILY HISTORY  No family history on file.    CURRENT MEDICATIONS  Reviewed.  See Encounter Summary.     ALLERGIES  No Known Allergies    PHYSICAL EXAM  VITAL SIGNS: /61   Pulse 90   Temp 36.5 °C (97.7 °F) (Temporal)   Resp (!) 30   Ht 1.676 m (5' 6\")   Wt 88.9 kg (196 lb)   LMP 05/30/2021   SpO2 96%   BMI 31.64 kg/m²   Constitutional: " Awake, alert in no apparent distress.  HENT: Normocephalic, Bilateral external ears normal. Nose normal.  Neck is supple without tenderness.  Eyes: Conjunctiva normal, non-icteric, EOMI. PERRLA.  Thorax & Lungs: Easy unlabored respirations, Clear to ascultation bilaterally.  Cardiovascular: Regular rate, Regular rhythm, No murmurs, rubs or gallops. Bilateral pulses symmetrical.   Abdomen:  Soft, nontender, nondistended, normal active bowel sounds.   :    Skin: Visualized skin is  Dry, No erythema, No rash.   Musculoskeletal:   No cyanosis, clubbing or edema. No leg asymmetry.   Neurologic: Alert, cranial nerves II through XII intact, negative test of skew, head impulse deferred as I movement and head movement causes significant nausea.  Horizontal nystagmus noted.  No dysmetria, no drift, finger-to-nose intact, heel-to-shin intact.  Sensation intact to light touch throughout.  Psychiatric: Normal affect, Normal mood  Lymphatic:  No cervical LAD      RADIOLOGY  No orders to display     EKG   12 lead Interpreted by me  Rhythm:  Normal sinus rhythm   Rate: 79  Axis: normal  Ectopy: none  Conduction: normal  ST Segments: no acute change  T Waves: no acute change  Clinical Impression: Normal EKG without acute changes       COURSE & MEDICAL DECISION MAKING  Pertinent Labs & Imaging studies reviewed. (See chart for details)    Differential diagnoses include but are not limited to: BPPV    6:05 PM - Medical record reviewed, patient seen and examined at bedside.  Patient's symptoms are classic for positional vertigo.  She has no red flags concerning for cerebellar stroke or dissection.  She is a young healthy neurologically intact 20-year-old.  Based on the neurologic exam this appears consistent with a peripheral component and neuroimaging not recommended.  We will treat with Ativan and Zofran, reassess following medications.    7:29 PM: Patient is currently asymptomatic.  We discussed Epley maneuvers and plan for  discharge.    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  The patient is neurologically intact, she did have nystagmus, test of skew is normal, no cerebellar findings.  Head impulse deferred as the patient was quite nauseous and recently vomiting.  She received 1 dose of Ativan and Zofran, reassessment is asymptomatic.  Do not suspect dissection, mass occupying lesion, ICH or CVA.  We discussed supportive care and repositioning maneuvers at home.    Discharge Medications:  New Prescriptions    MECLIZINE (ANTIVERT) 25 MG TAB    Take 1 tablet by mouth 3 times a day as needed.       The patient was discharged home (see d/c instructions) was told to return immediately for any signs or symptoms listed, or any worsening at all.  The patient verbally agreed to the discharge precautions and follow-up plan which is documented in EPIC.        FINAL IMPRESSION  1. Benign paroxysmal positional vertigo, unspecified laterality

## 2021-09-22 ENCOUNTER — IMMUNIZATION (OUTPATIENT)
Dept: OCCUPATIONAL MEDICINE | Facility: CLINIC | Age: 21
End: 2021-09-22
Payer: COMMERCIAL

## 2021-09-22 DIAGNOSIS — Z23 NEED FOR VACCINATION: Primary | ICD-10-CM

## 2021-09-25 PROCEDURE — 90686 IIV4 VACC NO PRSV 0.5 ML IM: CPT | Performed by: PREVENTIVE MEDICINE

## 2021-11-19 ENCOUNTER — PHARMACY VISIT (OUTPATIENT)
Dept: PHARMACY | Facility: MEDICAL CENTER | Age: 21
End: 2021-11-19
Payer: COMMERCIAL

## 2021-11-19 PROCEDURE — RXMED WILLOW AMBULATORY MEDICATION CHARGE: Performed by: INTERNAL MEDICINE

## 2021-11-19 RX ORDER — CX-024414 0.2 MG/ML
0.25 INJECTION, SUSPENSION INTRAMUSCULAR
Qty: 0.25 ML | Refills: 0 | Status: SHIPPED | OUTPATIENT
Start: 2021-11-19 | End: 2023-10-09

## 2021-11-24 ENCOUNTER — APPOINTMENT (OUTPATIENT)
Dept: URGENT CARE | Facility: CLINIC | Age: 21
End: 2021-11-24
Payer: COMMERCIAL

## 2021-11-24 ENCOUNTER — HOSPITAL ENCOUNTER (EMERGENCY)
Facility: MEDICAL CENTER | Age: 21
End: 2021-11-24
Attending: EMERGENCY MEDICINE
Payer: COMMERCIAL

## 2021-11-24 VITALS
OXYGEN SATURATION: 98 % | WEIGHT: 209.44 LBS | DIASTOLIC BLOOD PRESSURE: 66 MMHG | BODY MASS INDEX: 37.11 KG/M2 | SYSTOLIC BLOOD PRESSURE: 119 MMHG | HEIGHT: 63 IN | RESPIRATION RATE: 16 BRPM | TEMPERATURE: 97.8 F | HEART RATE: 76 BPM

## 2021-11-24 DIAGNOSIS — Z77.098 EXPOSURE TO CHEMICAL IRRITANT: ICD-10-CM

## 2021-11-24 DIAGNOSIS — H57.12 LEFT EYE PAIN: ICD-10-CM

## 2021-11-24 PROCEDURE — 700101 HCHG RX REV CODE 250: Performed by: EMERGENCY MEDICINE

## 2021-11-24 PROCEDURE — 99283 EMERGENCY DEPT VISIT LOW MDM: CPT

## 2021-11-24 RX ORDER — PROPARACAINE HYDROCHLORIDE 5 MG/ML
1 SOLUTION/ DROPS OPHTHALMIC ONCE
Status: COMPLETED | OUTPATIENT
Start: 2021-11-24 | End: 2021-11-24

## 2021-11-24 RX ADMIN — PROPARACAINE HYDROCHLORIDE 1 DROP: 5 SOLUTION/ DROPS OPHTHALMIC at 20:15

## 2021-11-24 RX ADMIN — FLUORESCEIN SODIUM 1 MG: 1 STRIP OPHTHALMIC at 20:15

## 2021-11-24 ASSESSMENT — FIBROSIS 4 INDEX: FIB4 SCORE: 0.35

## 2021-11-24 NOTE — LETTER
"  FORM C-4:  EMPLOYEE’S CLAIM FOR COMPENSATION/ REPORT OF INITIAL TREATMENT  EMPLOYEE’S CLAIM - PROVIDE ALL INFORMATION REQUESTED   First Name Julianna Last Name Alex Birthdate 2000  Sex female Claim Number   Home Address [address]             [place] [address]                                   Age  21 y.o. Height  1.6 m (5' 3\") Weight  95 kg (209 lb 7 oz) 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 (if applicable) Working    How did this injury or occupational disease occur? Be specific and answer in detail. Use additional sheet if necessary)  After I wash my hands in dohands santitizer and then my eyes is itching so i can itching it. The I didnt notice the my have sanitzer tyhen sanitize flashing in my eye   If you believe that you have an occupational disease, when did you first have knowledge of the disability and it relationship to your employment? na Witnesses to the Accident  Nothing   Nature of Injury or Occupational Disease  Poisoning - Chemical (Other than metals) Part(s) of Body Injured or Affected  Eye (L), Eye (R), N/A    I CERTIFY THAT THE ABOVE IS TRUE AND CORRECT TO THE BEST OF MY KNOWLEDGE AND THAT I HAVE PROVIDED THIS INFORMATION IN ORDER TO " OBTAIN THE BENEFITS OF [place]).  I HEREBY AUTHORIZE ANY PHYSICIAN, CHIROPRACTOR, SURGEON, PRACTITIONER, OR OTHER PERSON, ANY HOSPITAL, INCLUDING [facility] OR [facility], ANY MEDICAL SERVICE ORGANIZATION, ANY INSURANCE COMPANY, OR OTHER INSTITUTION OR ORGANIZATION TO RELEASE TO EACH OTHER, ANY MEDICAL OR OTHER INFORMATION, INCLUDING BENEFITS PAID OR PAYABLE, PERTINENT TO THIS INJURY OR DISEASE, EXCEPT INFORMATION RELATIVE TO DIAGNOSIS, TREATMENT AND/OR COUNSELING FOR AIDS, PSYCHOLOGICAL CONDITIONS, ALCOHOL OR CONTROLLED SUBSTANCES, FOR WHICH I MUST GIVE SPECIFIC AUTHORIZATION.  A PHOTOSTAT OF THIS AUTHORIZATION SHALL BE AS VALID AS THE ORIGINAL.  Date                                      Place      [facility]                                                                    Employee’s Signature   THIS REPORT MUST BE COMPLETED AND MAILED WITHIN 3 WORKING DAYS OF TREATMENT   Place [facility], EMERGENCY DEPT                       Name of Facility [facility]   Date  11/24/2021 Diagnosis  (H57.12) Left eye pain  (Z77.098) Exposure to chemical irritant Is there evidence the injured employee was under the influence of alcohol and/or another controlled substance at the time of accident?   Hour  9:51 PM Description of Injury or Disease  Left eye pain  Exposure to chemical irritant No   Treatment  Patient was evaluated for chemical exposure to the left eye with hand .  She had flushed her eye thoroughly prior to my evaluation which was 5 hours ago.  Proparacaine drops were used for local anesthesia, fluorescein stain reveals no corneal abrasions or ulcerations.  She just had conjunctival irritation.  She is to use saline drops as needed for the next 1 to 2 days and follow-up with [facility] in 3 days.  Have you advised the patient to remain  "off work five days or more?         No   X-Ray Findings    If Yes   From Date    To Date      From information given by the employee, together with medical evidence, can you directly connect this injury or occupational disease as job incurred? Yes If No, is employee capable of: Full Duty  Yes Modified Duty      Is additional medical care by a physician indicated? Yes If Modified Duty, Specify any Limitations / Restrictions       Do you know of any previous injury or disease contributing to this condition or occupational disease? No    Date 1/18/2022 Print Doctor’s Name Flori Duffy certify the employer’s copy of this form was mailed on:   Address [address] [place] [id number] [phone] INSURER’S USE ONLY   Provider’s Tax ID Number   Telephone Dept: [phone]    Doctor’s Signature e-FLORI Quiles D.O. Degree  DO      Form C-4 (rev.10/07)                                                                         BRIEF DESCRIPTION OF RIGHTS AND BENEFITS  (Pursuant to [place] 616[place].050)    Notice of Injury or Occupational Disease (Incident Report Form C-1): If an injury or occupational disease (OD) arises out of and in the course of employment, you must provide written notice to your employer as soon as practicable, but no later than 7 days after the accident or OD. Your employer shall maintain a sufficient supply of the required forms.    Claim for Compensation (Form C-4): If medical treatment is sought, the form C-4 is available at the place of initial treatment. A completed \"Claim for Compensation\" (Form C-4) must be filed within 90 days after an accident or OD. The treating physician or chiropractor must, within 3 working days after treatment, complete and mail to the employer, the employer's insurer and third-party , the Claim for Compensation.    Medical Treatment: If you require medical treatment for your on-the-job injury or OD, you may be required to select a physician or " chiropractor from a list provided by your workers’ compensation insurer, if it has contracted with an Organization for Managed Care (MCO) or Preferred Provider Organization (PPO) or providers of health care. If your employer has not entered into a contract with an MCO or PPO, you may select a physician or chiropractor from the Panel of Physicians and Chiropractors. Any medical costs related to your industrial injury or OD will be paid by your insurer.    Temporary Total Disability (TTD): If your doctor has certified that you are unable to work for a period of at least 5 consecutive days, or 5 cumulative days in a 20-day period, or places restrictions on you that your employer does not accommodate, you may be entitled to TTD compensation.    Temporary Partial Disability (TPD): If the wage you receive upon reemployment is less than the compensation for TTD to which you are entitled, the insurer may be required to pay you TPD compensation to make up the difference. TPD can only be paid for a maximum of 24 months.    Permanent Partial Disability (PPD): When your medical condition is stable and there is an indication of a PPD as a result of your injury or OD, within 30 days, your insurer must arrange for an evaluation by a rating physician or chiropractor to determine the degree of your PPD. The amount of your PPD award depends on the date of injury, the results of the PPD evaluation, your age and wage.    Permanent Total Disability (PTD): If you are medically certified by a treating physician or chiropractor as permanently and totally disabled and have been granted a PTD status by your insurer, you are entitled to receive monthly benefits not to exceed 66 2/3% of your average monthly wage. The amount of your PTD payments is subject to reduction if you previously received a lump-sum PPD award.    Vocational Rehabilitation Services: You may be eligible for vocational rehabilitation services if you are unable to return to  the job due to a permanent physical impairment or permanent restrictions as a result of your injury or occupational disease.    Transportation and Per Vipul Reimbursement: You may be eligible for travel expenses and per vipul associated with medical treatment.    Reopening: You may be able to reopen your claim if your condition worsens after claim closure.     Appeal Process: If you disagree with a written determination issued by the insurer or the insurer does not respond to your request, you may appeal to the Department of Administration, , by following the instructions contained in your determination letter. You must appeal the determination within 70 days from the date of the determination letter at [address], [address], [place], [place] [address], or [address] [facility], [place], [place] [address]. If you disagree with the  decision, you may appeal to the Department of Administration, . You must file your appeal within 30 days from the date of the  decision letter at [address], [address], [place], [place] [address], or [address] [facility] [address], [place] [address]. If you disagree with a decision of an , you may file a petition for judicial review with the District Court. You must do so within 30 days of the Appeal Officer’s decision. You may be represented by an  at your own expense or you may contact the [facility] for possible representation.    [place]  for Injured Workers (NAIW): If you disagree with a  decision, you may request that NAIW represent you without charge at an  Hearing. For information regarding denial of benefits, you may contact the [facility] at: [address], [address], [place] [address], [phone], or [address] [facility] [facility] [address], [place] [address], [phone]    To File a Complaint with the Division: If you wish  to file a complaint with the  of the Division of Industrial Relations (DIR),  please contact the Workers’ Compensation Section, [address], [address], [place], [place] [address], telephone (767) [phone], or [address], [address], [place], [place] [address], telephone [phone].    For assistance with Workers’ Compensation Issues: You may contact the [facility] Office for Consumer Health Assistance, [address], [address], [place], [place] [address], Toll Free 1-556.218.3654, Web site: http://[facility].[place].gov/Programs/DANY E-mail: dany@[facility].[place].gov  D-2 (rev. 10/20)              __________________________________________________________________                                    _________________            Employee Name / Signature                                                                                                                            Date

## 2021-11-24 NOTE — LETTER
"  FORM C-4:  EMPLOYEE’S CLAIM FOR COMPENSATION/ REPORT OF INITIAL TREATMENT  EMPLOYEE’S CLAIM - PROVIDE ALL INFORMATION REQUESTED   First Name Julianna Last Name Alex Birthdate 2000  Sex female Claim Number   Home Address [address] [place] [address]             Zip [address]                                   Age  21 y.o. 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 (if applicable) Working    How did this injury or occupational disease occur? Be specific and answer in detail. Use additional sheet if necessary)  After I wash my hands in dohands santitizer and then my eyes is itching so i can itching it. The I didnt notice the my have sanitzer tyhen sanitize flashing in my eye   If you believe that you have an occupational disease, when did you first have knowledge of the disability and it relationship to your employment? na Witnesses to the Accident  Nothing   Nature of Injury or Occupational Disease  Poisoning - Chemical (Other than metals) Part(s) of Body Injured or Affected  Eye (L), Eye (R), N/A    I CERTIFY THAT THE ABOVE IS TRUE AND CORRECT TO THE BEST OF MY KNOWLEDGE AND THAT I HAVE PROVIDED THIS " INFORMATION IN ORDER TO OBTAIN THE BENEFITS OF [place]).  I HEREBY AUTHORIZE ANY PHYSICIAN, CHIROPRACTOR, SURGEON, PRACTITIONER, OR OTHER PERSON, ANY HOSPITAL, INCLUDING [facility] OR [facility], ANY MEDICAL SERVICE ORGANIZATION, ANY INSURANCE COMPANY, OR OTHER INSTITUTION OR ORGANIZATION TO RELEASE TO EACH OTHER, ANY MEDICAL OR OTHER INFORMATION, INCLUDING BENEFITS PAID OR PAYABLE, PERTINENT TO THIS INJURY OR DISEASE, EXCEPT INFORMATION RELATIVE TO DIAGNOSIS, TREATMENT AND/OR COUNSELING FOR AIDS, PSYCHOLOGICAL CONDITIONS, ALCOHOL OR CONTROLLED SUBSTANCES, FOR WHICH I MUST GIVE SPECIFIC AUTHORIZATION.  A PHOTOSTAT OF THIS AUTHORIZATION SHALL BE AS VALID AS THE ORIGINAL.  Date  11/24/2021      Place     [facility]         Employee’s Signature   THIS REPORT MUST BE COMPLETED AND MAILED WITHIN 3 WORKING DAYS OF TREATMENT   Place [facility], EMERGENCY DEPT                       Name of Facility [facility]   Date  11/24/2021 Diagnosis  (H57.12) Left eye pain  (Z77.098) Exposure to chemical irritant Is there evidence the injured employee was under the influence of alcohol and/or another controlled substance at the time of accident?   Hour  8:17 PM Description of Injury or Disease  Left eye pain  Exposure to chemical irritant No   Treatment  Patient was evaluated for chemical exposure to the left eye with hand .  She had flushed her eye thoroughly prior to my evaluation which was 5 hours ago.  Proparacaine drops were used for local anesthesia, fluorescein stain reveals no corneal abrasions or ulcerations.  She just had conjunctival irritation.  She is to use saline drops as needed for the next 1 to 2 days and follow-up with [facility] in 3 days.  Have you advised the patient to remain off work five days or more?         No   X-Ray  "Findings    If Yes   From Date    To Date      From information given by the employee, together with medical evidence, can you directly connect this injury or occupational disease as job incurred? Yes If No, is employee capable of: Full Duty  Yes Modified Duty      Is additional medical care by a physician indicated? Yes If Modified Duty, Specify any Limitations / Restrictions       Do you know of any previous injury or disease contributing to this condition or occupational disease? No    Date 11/24/2021 Print Doctor’s Name Flori Duffy certify the employer’s copy of this form was mailed on:   Address [address] [place] [id number] [phone] INSURER’S USE ONLY   Provider’s Tax ID Number   Telephone Dept: [phone]    Doctor’s Signature e-SignFLORI DUFFY D.O. Degree  MD      Form C-4 (rev.10/07)                                                                         BRIEF DESCRIPTION OF RIGHTS AND BENEFITS  (Pursuant to [place] 616[place])    Notice of Injury or Occupational Disease (Incident Report Form C-1): If an injury or occupational disease (OD) arises out of and in the course of employment, you must provide written notice to your employer as soon as practicable, but no later than 7 days after the accident or OD. Your employer shall maintain a sufficient supply of the required forms.    Claim for Compensation (Form C-4): If medical treatment is sought, the form C-4 is available at the place of initial treatment. A completed \"Claim for Compensation\" (Form C-4) must be filed within 90 days after an accident or OD. The treating physician or chiropractor must, within 3 working days after treatment, complete and mail to the employer, the employer's insurer and third-party , the Claim for Compensation.    Medical Treatment: If you require medical treatment for your on-the-job injury or OD, you may be required to select a physician or chiropractor from a list provided by your workers’ " compensation insurer, if it has contracted with an Organization for Managed Care (MCO) or Preferred Provider Organization (PPO) or providers of health care. If your employer has not entered into a contract with an MCO or PPO, you may select a physician or chiropractor from the Panel of Physicians and Chiropractors. Any medical costs related to your industrial injury or OD will be paid by your insurer.    Temporary Total Disability (TTD): If your doctor has certified that you are unable to work for a period of at least 5 consecutive days, or 5 cumulative days in a 20-day period, or places restrictions on you that your employer does not accommodate, you may be entitled to TTD compensation.    Temporary Partial Disability (TPD): If the wage you receive upon reemployment is less than the compensation for TTD to which you are entitled, the insurer may be required to pay you TPD compensation to make up the difference. TPD can only be paid for a maximum of 24 months.    Permanent Partial Disability (PPD): When your medical condition is stable and there is an indication of a PPD as a result of your injury or OD, within 30 days, your insurer must arrange for an evaluation by a rating physician or chiropractor to determine the degree of your PPD. The amount of your PPD award depends on the date of injury, the results of the PPD evaluation, your age and wage.    Permanent Total Disability (PTD): If you are medically certified by a treating physician or chiropractor as permanently and totally disabled and have been granted a PTD status by your insurer, you are entitled to receive monthly benefits not to exceed 66 2/3% of your average monthly wage. The amount of your PTD payments is subject to reduction if you previously received a lump-sum PPD award.    Vocational Rehabilitation Services: You may be eligible for vocational rehabilitation services if you are unable to return to the job due to a permanent physical impairment or  permanent restrictions as a result of your injury or occupational disease.    Transportation and Per Vipul Reimbursement: You may be eligible for travel expenses and per vipul associated with medical treatment.    Reopening: You may be able to reopen your claim if your condition worsens after claim closure.     Appeal Process: If you disagree with a written determination issued by the insurer or the insurer does not respond to your request, you may appeal to the Department of Administration, , by following the instructions contained in your determination letter. You must appeal the determination within 70 days from the date of the determination letter at [address], [address], [place], [place] [address], or [address], [address], [place], [place] [address]. If you disagree with the  decision, you may appeal to the Department of Administration, . You must file your appeal within 30 days from the date of the  decision letter at [address], [address], [place], [place] [address], or [address], [place], [place] [address]. If you disagree with a decision of an , you may file a petition for judicial review with the District Court. You must do so within 30 days of the Appeal Officer’s decision. You may be represented by an  at your own expense or you may contact the [facility] for possible representation.    [place]  for Injured Workers (NAIW): If you disagree with a  decision, you may request that NAIW represent you without charge at an  Hearing. For information regarding denial of benefits, you may contact the [facility] at: [address], [address], [place] [address], [phone], or [address], [place] [address], [phone]    To File a Complaint with the Division: If you wish to file a complaint with the  of the  Division of Industrial Relations (DIR),  please contact the Workers’ Compensation Section, [address], [address], [place], [place] [address], telephone [phone], or [address], [address], [place], [place] [address], telephone [phone].    For assistance with Workers’ Compensation Issues: You may contact the [facility] Office for Consumer Health Assistance, [address], [address], [place], [place] [address], Toll Free 1-444.789.5068, Web site: http://[facility].[place].gov/Programs/DANY E-mail: dany@[facility].[place].gov  D-2 (rev. 10/20)              __________________________________________________________________                                    11/242021__            Employee Name / Signature                                                                                                                            Date

## 2021-11-25 NOTE — DISCHARGE INSTRUCTIONS
Use the saline eyedrops as needed for eye irritation and pain for the next 1 to 2 days.  Follow-up with [facility] on Monday if symptoms worsen or persist  Return to the ER sooner if any visual problems.

## 2021-11-25 NOTE — ED TRIAGE NOTES
Pt to ED, sent form occupational health, for eye burning and irritation. At 1500 today she got hand  in the eye. She has been sent for further eval related to blurred vision. +redness and tearing. She reports mild burning pain at this time.     Pt educated on ED process and asked to wait in lobby. Patient educated on importance of alerting staff to new or worsening symptoms or concerns.

## 2021-11-25 NOTE — ED PROVIDER NOTES
ED Provider Note     Scribed for Flori Duffy D.O. by Sarabjit Britt. 11/24/2021, 7:52 PM.     Primary care provider: Davidson Simpson M.D.  Means of arrival: walk in         History obtained from: patient  History limited by: none    CHIEF COMPLAINT  Chief Complaint   Patient presents with   • Eye Pain   • Blurred Vision       HPI  Julianna Johnson is a 21 y.o. female who presents to the emergency Department for left eye pain onset 3:00 PM today. The patient was at work today when she accidentally got hand  in her left eye. She rinsed out her eye with water with mild alleviation. She reports associated left eye pain. She notes she also had blurry vision in her left eye earlier, but this has since resolved. She denies any right eye pain or vision changes.    REVIEW OF SYSTEMS  Pertinent positives include left eye pain, blurry vision (resolved). Pertinent negatives include no right eye pain.   See HPI for further details.    PAST MEDICAL HISTORY  History reviewed. No pertinent past medical history.    FAMILY HISTORY  History reviewed. No pertinent family history.    SOCIAL HISTORY  Social History     Tobacco Use   • Smoking status: Never Smoker   • Smokeless tobacco: Never Used   Vaping Use   • Vaping Use: Never used   Substance Use Topics   • Alcohol use: Not Currently   • Drug use: Not Currently      Social History     Substance and Sexual Activity   Drug Use Not Currently       SURGICAL HISTORY  Past Surgical History:   Procedure Laterality Date   • PB EXPLORATORY OF ABDOMEN N/A 6/22/2020    Procedure: MINILAPAROTOMY;  Surgeon: Mary Jesus M.D.;  Location: SURGERY SAME DAY [facility] ORS;  Service: Labor and Delivery   • OVARIAN CYSTECTOMY Right 6/22/2020    Procedure: EXCISION, CYST, OVARY;  Surgeon: Mary Jesus M.D.;  Location: SURGERY SAME DAY [facility] ORS;  Service: Labor and Delivery       CURRENT MEDICATIONS  Current Outpatient Medications:   •   "COVID-19 mRNA vaccine, Moderna, (MODERNA COVID-19 VACCINE) 100 MCG/0.5ML Suspension injection, Inject 0.25 mL into the shoulder, thigh, or buttocks., Disp: 0.25 mL, Rfl: 0  •  meclizine (ANTIVERT) 25 MG Tab, Take 1 tablet by mouth 3 times a day as needed., Disp: 30 tablet, Rfl: 0  •  ondansetron (ZOFRAN) 4 MG Tab tablet, Take 1 Tab by mouth every four hours as needed for Nausea/Vomiting., Disp: 30 Tab, Rfl: 3  •  omeprazole (PRILOSEC) 20 MG delayed-release capsule, Take 1 Cap by mouth every day., Disp: 30 Cap, Rfl: 3  •  omeprazole (PRILOSEC) 40 MG delayed-release capsule, Take 1 Cap by mouth every day., Disp: 30 Cap, Rfl: 3  •  diphenhydrAMINE (BENADRYL) 25 MG capsule, Take 1 Cap by mouth every 6 hours as needed., Disp: 30 Cap, Rfl: 3  •  omeprazole (PRILOSEC) 40 MG delayed-release capsule, Take 1 Cap by mouth every day., Disp: 30 Cap, Rfl: 1  •  ondansetron (ZOFRAN ODT) 4 MG TABLET DISPERSIBLE, Take 1 Tab by mouth every four hours as needed for Nausea (give PO if no IV route available)., Disp: 30 Tab, Rfl: 1    ALLERGIES  No Known Allergies    PHYSICAL EXAM  VITAL SIGNS: /75   Pulse 73   Temp 36.3 °C (97.3 °F) (Temporal)   Resp 16   Ht 1.6 m (5' 3\")   Wt 95 kg (209 lb 7 oz)   LMP 11/21/2021   SpO2 97%   BMI 37.10 kg/m²     Constitutional: Patient is well developed, well nourished. Non-toxic appearing. No acute distress.   HENT: Normocephalic, atraumatic.  Eyes: PERRL, EOMI, Left eye has increased erythema to conjunctiva. No corneal abrasion or ulceration. Slight fluorescein uptake on lateral conjunctiva at around 3 o'clock. Right eye normal.  Cardiovascular: Normal heart rate and Regular rhythm.  Thorax & Lungs: No respiratory distress  Skin: Warm, Dry, No rashes.   Musculoskeletal: Normal range of motion in all major joints. No tenderness to palpation or major deformities noted.   Neurologic: Alert & oriented x 3, Normal motor function, Normal sensory  Psychiatric: Affect normal, Judgment normal, " Mood normal.     COURSE & MEDICAL DECISION MAKING  Pertinent Labs & Imaging studies reviewed. (See chart for details)    7:52 PM - Patient seen and evaluated at bedside. Patient will be treated with proparacaine 0.5% 1 drop, fluorescein strip 1 mg for detailed eye exam.    She was given saline drops to use for the next day as needed and she is to follow-up with [facility] for recheck.  She is stable upon discharge.      DISPOSITION:  Patient will be discharged home in stable condition.    FOLLOW UP:  Airam Breaux  [address] [facility] [address]  [phone]    Schedule an appointment as soon as possible for a visit in 2 days  As needed, If symptoms worsen      OUTPATIENT MEDICATIONS:  Discharge Medication List as of 11/24/2021  8:40 PM            FINAL IMPRESSION  1. Left eye pain    2. Exposure to chemical irritant         Sarabjit ALBERTO (Scribe), am scribing for, and in the presence of, Flori Duffy D.O..    Electronically signed by: Sarabjit Britt ([place]), 11/24/2021    Flori ALBERTO D.O. personally performed the services described in this documentation, as scribed by Sarabjit Britt in my presence, and it is both accurate and complete.    The note accurately reflects work and decisions made by me.  Flori Duffy D.O.  11/25/2021  12:29 AM

## 2021-11-25 NOTE — ED NOTES
Patient provided with discharge instructions. Education complete, all questions answered.   Vitals stable. All personal belongings accounted for.   Patient ambulated out of the ER with family.

## 2023-10-09 ENCOUNTER — OFFICE VISIT (OUTPATIENT)
Dept: MEDICAL GROUP | Facility: MEDICAL CENTER | Age: 23
End: 2023-10-09
Payer: COMMERCIAL

## 2023-10-09 VITALS
HEIGHT: 61 IN | TEMPERATURE: 97.6 F | WEIGHT: 210 LBS | OXYGEN SATURATION: 98 % | SYSTOLIC BLOOD PRESSURE: 120 MMHG | HEART RATE: 72 BPM | RESPIRATION RATE: 16 BRPM | BODY MASS INDEX: 39.65 KG/M2 | DIASTOLIC BLOOD PRESSURE: 72 MMHG

## 2023-10-09 DIAGNOSIS — Z23 NEED FOR VACCINATION: ICD-10-CM

## 2023-10-09 DIAGNOSIS — Z11.8 SCREENING FOR CHLAMYDIAL DISEASE: ICD-10-CM

## 2023-10-09 DIAGNOSIS — Z11.59 ENCOUNTER FOR HEPATITIS C SCREENING TEST FOR LOW RISK PATIENT: ICD-10-CM

## 2023-10-09 DIAGNOSIS — R73.01 IFG (IMPAIRED FASTING GLUCOSE): ICD-10-CM

## 2023-10-09 DIAGNOSIS — Z13.6 SCREENING FOR CARDIOVASCULAR CONDITION: ICD-10-CM

## 2023-10-09 DIAGNOSIS — Z86.2 HISTORY OF ANEMIA: ICD-10-CM

## 2023-10-09 DIAGNOSIS — Z76.89 ENCOUNTER TO ESTABLISH CARE: ICD-10-CM

## 2023-10-09 DIAGNOSIS — E55.9 VITAMIN D DEFICIENCY: ICD-10-CM

## 2023-10-09 DIAGNOSIS — Z13.228 SCREENING FOR METABOLIC DISORDER: ICD-10-CM

## 2023-10-09 DIAGNOSIS — D36.9 DERMOID TUMOR: Chronic | ICD-10-CM

## 2023-10-09 DIAGNOSIS — Z13.29 SCREENING FOR THYROID DISORDER: ICD-10-CM

## 2023-10-09 DIAGNOSIS — E66.9 OBESITY (BMI 30-39.9): ICD-10-CM

## 2023-10-09 PROBLEM — R73.9 HYPERGLYCEMIA: Status: RESOLVED | Noted: 2020-02-05 | Resolved: 2023-10-09

## 2023-10-09 PROBLEM — R93.5 ABNORMAL CT OF THE ABDOMEN: Status: RESOLVED | Noted: 2019-12-05 | Resolved: 2023-10-09

## 2023-10-09 PROBLEM — R11.2 INTRACTABLE VOMITING WITH NAUSEA: Status: RESOLVED | Noted: 2020-02-05 | Resolved: 2023-10-09

## 2023-10-09 PROBLEM — D72.829 LEUKOCYTOSIS: Status: RESOLVED | Noted: 2020-02-05 | Resolved: 2023-10-09

## 2023-10-09 PROCEDURE — 90472 IMMUNIZATION ADMIN EACH ADD: CPT | Performed by: NURSE PRACTITIONER

## 2023-10-09 PROCEDURE — 3078F DIAST BP <80 MM HG: CPT | Performed by: NURSE PRACTITIONER

## 2023-10-09 PROCEDURE — 90471 IMMUNIZATION ADMIN: CPT | Performed by: NURSE PRACTITIONER

## 2023-10-09 PROCEDURE — 99204 OFFICE O/P NEW MOD 45 MIN: CPT | Mod: 25 | Performed by: NURSE PRACTITIONER

## 2023-10-09 PROCEDURE — 90746 HEPB VACCINE 3 DOSE ADULT IM: CPT | Performed by: NURSE PRACTITIONER

## 2023-10-09 PROCEDURE — 90651 9VHPV VACCINE 2/3 DOSE IM: CPT | Performed by: NURSE PRACTITIONER

## 2023-10-09 PROCEDURE — 90686 IIV4 VACC NO PRSV 0.5 ML IM: CPT | Performed by: NURSE PRACTITIONER

## 2023-10-09 PROCEDURE — 3074F SYST BP LT 130 MM HG: CPT | Performed by: NURSE PRACTITIONER

## 2023-10-09 ASSESSMENT — PATIENT HEALTH QUESTIONNAIRE - PHQ9: CLINICAL INTERPRETATION OF PHQ2 SCORE: 0

## 2023-10-09 NOTE — PROGRESS NOTES
"Chief Complaint   Patient presents with    Establish Care          Julianna Johnson is a 23 y.o. female here to establish care and to discuss the evaluation and management of:    Sister presents today with patient.     No previous PCP to request records from.     Chart reviewed    ROS: SEE ABOVE      No current outpatient medications on file.    No Known Allergies    Past Medical History:   Diagnosis Date    Ovarian cyst      Past Surgical History:   Procedure Laterality Date    IL EXPLORATORY OF ABDOMEN N/A 6/22/2020    Procedure: MINILAPAROTOMY;  Surgeon: Mary Jesus M.D.;  Location: SURGERY SAME DAY [facility] ORS;  Service: Labor and Delivery    OVARIAN CYSTECTOMY Right 6/22/2020    Procedure: EXCISION, CYST, OVARY;  Surgeon: Mary Jesus M.D.;  Location: SURGERY SAME DAY [facility] ORS;  Service: Labor and Delivery     History reviewed. No pertinent family history.  Social History     Socioeconomic History    Marital status: Single     Spouse name: Not on file    Number of children: Not on file    Years of education: Not on file    Highest education level: Not on file   Occupational History    Not on file   Tobacco Use    Smoking status: Never    Smokeless tobacco: Never   Vaping Use    Vaping Use: Every day    Substances: Flavoring   Substance and Sexual Activity    Alcohol use: Not Currently    Drug use: Not Currently    Sexual activity: Not Currently     Partners: Male   Other Topics Concern    Not on file   Social History Narrative    Not on file     Social Determinants of Health     Financial Resource Strain: Not on file   Food Insecurity: Not on file   Transportation Needs: Not on file   Physical Activity: Not on file   Stress: Not on file   Social Connections: Not on file   Intimate Partner Violence: Not on file   Housing Stability: Not on file       Objective:     Vitals: /72   Pulse 72   Temp 36.4 °C (97.6 °F)   Resp 16   Ht 1.549 m (5' 1\")   Wt 95.3 " kg (210 lb)   LMP 2023 (Exact Date)   SpO2 98%   BMI 39.68 kg/m²      General: Alert, pleasant, NAD  HEENT:  Normocephalic.  Neck supple.  No thyromegaly or masses palpated. No cervical or supraclavicular lymphadenopathy.  Heart:  Regular rate and rhythm.  S1 and S2 normal.  No murmurs appreciated.    Respiratory:  Normal respiratory effort.  Clear to auscultation bilaterally.    Skin:  Warm, dry, no rashes, possible early acanthosis nigricans on posterior neck,   One single dark facial hair on chin.   Musculoskeletal:  Gait is normal.  Moves all extremities well.  Extremities:   No leg edema.  Neurological: No tremors  Psych:  Affect/mood is normal, judgement is good, memory is intact, grooming is appropriate.    Assessment and Plan.     23 y.o. female to establish care and discuss the followin. IFG (impaired fasting glucose)  Noted on labs  Check A1c.   - HEMOGLOBIN A1C; Future    2. Obesity (BMI 30-39.9)  Chronic. Check TSH, A1c.   - Patient identified as having weight management issue.  Appropriate orders and counseling given.    3. Vitamin D deficiency  - VITAMIN D,25 HYDROXY (DEFICIENCY); Future    4. History of anemia  Noted on previous labs.  Denies consistently heavy menstrual cycles.  Check CBC, Ferritin   - CBC WITHOUT DIFFERENTIAL; Future  - FERRITIN; Future    5. Screening for cardiovascular condition  - Lipid Profile; Future    6. Screening for chlamydial disease  - Chlamydia/GC, PCR (Urine); Future    7. Screening for thyroid disorder  - TSH; Future    8. Screening for metabolic disorder  - Comp Metabolic Panel; Future    9. Encounter for hepatitis C screening test for low risk patient  - HEP C VIRUS ANTIBODY; Future    10. Need for vaccination  - Hep B Adult 20+  - Gardasil 9  - INFLUENZA VACCINE QUAD INJ (PF)    11. Encounter to establish care    Recommend scheduling Pap with myself or with GYN.      Return for follow up 2-3 months for lab review and 3rd HPV vaccine..    {I have  placed the above orders and discussed them with an approved delegating provider.  The MA is performing the below orders under the direction of Dr. Troy GUAMAN

## 2023-12-15 ENCOUNTER — HOSPITAL ENCOUNTER (OUTPATIENT)
Dept: LAB | Facility: MEDICAL CENTER | Age: 23
End: 2023-12-15
Attending: NURSE PRACTITIONER
Payer: COMMERCIAL

## 2023-12-15 DIAGNOSIS — Z86.2 HISTORY OF ANEMIA: ICD-10-CM

## 2023-12-15 DIAGNOSIS — Z11.8 SCREENING FOR CHLAMYDIAL DISEASE: ICD-10-CM

## 2023-12-15 DIAGNOSIS — Z13.6 SCREENING FOR CARDIOVASCULAR CONDITION: ICD-10-CM

## 2023-12-15 DIAGNOSIS — Z13.29 SCREENING FOR THYROID DISORDER: ICD-10-CM

## 2023-12-15 DIAGNOSIS — R73.01 IFG (IMPAIRED FASTING GLUCOSE): ICD-10-CM

## 2023-12-15 DIAGNOSIS — Z11.59 ENCOUNTER FOR HEPATITIS C SCREENING TEST FOR LOW RISK PATIENT: ICD-10-CM

## 2023-12-15 DIAGNOSIS — Z13.228 SCREENING FOR METABOLIC DISORDER: ICD-10-CM

## 2023-12-15 DIAGNOSIS — E55.9 VITAMIN D DEFICIENCY: ICD-10-CM

## 2023-12-15 LAB
25(OH)D3 SERPL-MCNC: 22 NG/ML (ref 30–100)
ALBUMIN SERPL BCP-MCNC: 4.3 G/DL (ref 3.2–4.9)
ALBUMIN/GLOB SERPL: 1.3 G/DL
ALP SERPL-CCNC: 77 U/L (ref 30–99)
ALT SERPL-CCNC: 34 U/L (ref 2–50)
ANION GAP SERPL CALC-SCNC: 9 MMOL/L (ref 7–16)
AST SERPL-CCNC: 21 U/L (ref 12–45)
BILIRUB SERPL-MCNC: 0.4 MG/DL (ref 0.1–1.5)
BUN SERPL-MCNC: 8 MG/DL (ref 8–22)
CALCIUM ALBUM COR SERPL-MCNC: 8.9 MG/DL (ref 8.5–10.5)
CALCIUM SERPL-MCNC: 9.1 MG/DL (ref 8.5–10.5)
CHLORIDE SERPL-SCNC: 104 MMOL/L (ref 96–112)
CHOLEST SERPL-MCNC: 170 MG/DL (ref 100–199)
CO2 SERPL-SCNC: 24 MMOL/L (ref 20–33)
CREAT SERPL-MCNC: 0.61 MG/DL (ref 0.5–1.4)
ERYTHROCYTE [DISTWIDTH] IN BLOOD BY AUTOMATED COUNT: 38.6 FL (ref 35.9–50)
EST. AVERAGE GLUCOSE BLD GHB EST-MCNC: 120 MG/DL
FASTING STATUS PATIENT QL REPORTED: NORMAL
FERRITIN SERPL-MCNC: 82.5 NG/ML (ref 10–291)
GFR SERPLBLD CREATININE-BSD FMLA CKD-EPI: 129 ML/MIN/1.73 M 2
GLOBULIN SER CALC-MCNC: 3.4 G/DL (ref 1.9–3.5)
GLUCOSE SERPL-MCNC: 100 MG/DL (ref 65–99)
HBA1C MFR BLD: 5.8 % (ref 4–5.6)
HCT VFR BLD AUTO: 43 % (ref 37–47)
HCV AB SER QL: NORMAL
HDLC SERPL-MCNC: 38 MG/DL
HGB BLD-MCNC: 14 G/DL (ref 12–16)
LDLC SERPL CALC-MCNC: 109 MG/DL
MCH RBC QN AUTO: 26.8 PG (ref 27–33)
MCHC RBC AUTO-ENTMCNC: 32.6 G/DL (ref 32.2–35.5)
MCV RBC AUTO: 82.4 FL (ref 81.4–97.8)
PLATELET # BLD AUTO: 360 K/UL (ref 164–446)
PMV BLD AUTO: 10.3 FL (ref 9–12.9)
POTASSIUM SERPL-SCNC: 4.6 MMOL/L (ref 3.6–5.5)
PROT SERPL-MCNC: 7.7 G/DL (ref 6–8.2)
RBC # BLD AUTO: 5.22 M/UL (ref 4.2–5.4)
SODIUM SERPL-SCNC: 137 MMOL/L (ref 135–145)
TRIGL SERPL-MCNC: 117 MG/DL (ref 0–149)
TSH SERPL DL<=0.005 MIU/L-ACNC: 3.16 UIU/ML (ref 0.38–5.33)
WBC # BLD AUTO: 7.9 K/UL (ref 4.8–10.8)

## 2023-12-15 PROCEDURE — 87491 CHLMYD TRACH DNA AMP PROBE: CPT

## 2023-12-15 PROCEDURE — 80061 LIPID PANEL: CPT

## 2023-12-15 PROCEDURE — 87591 N.GONORRHOEAE DNA AMP PROB: CPT

## 2023-12-15 PROCEDURE — 84443 ASSAY THYROID STIM HORMONE: CPT

## 2023-12-15 PROCEDURE — 85027 COMPLETE CBC AUTOMATED: CPT

## 2023-12-15 PROCEDURE — 36415 COLL VENOUS BLD VENIPUNCTURE: CPT

## 2023-12-15 PROCEDURE — 82306 VITAMIN D 25 HYDROXY: CPT

## 2023-12-15 PROCEDURE — 80053 COMPREHEN METABOLIC PANEL: CPT

## 2023-12-15 PROCEDURE — 86803 HEPATITIS C AB TEST: CPT

## 2023-12-15 PROCEDURE — 83036 HEMOGLOBIN GLYCOSYLATED A1C: CPT

## 2023-12-15 PROCEDURE — 82728 ASSAY OF FERRITIN: CPT

## 2023-12-16 LAB
C TRACH DNA SPEC QL NAA+PROBE: NEGATIVE
N GONORRHOEA DNA SPEC QL NAA+PROBE: NEGATIVE
SPECIMEN SOURCE: NORMAL

## 2023-12-17 ENCOUNTER — HOSPITAL ENCOUNTER (EMERGENCY)
Facility: MEDICAL CENTER | Age: 23
End: 2023-12-17
Attending: EMERGENCY MEDICINE
Payer: COMMERCIAL

## 2023-12-17 ENCOUNTER — OFFICE VISIT (OUTPATIENT)
Dept: URGENT CARE | Facility: CLINIC | Age: 23
End: 2023-12-17
Payer: COMMERCIAL

## 2023-12-17 VITALS
TEMPERATURE: 97.5 F | OXYGEN SATURATION: 99 % | HEIGHT: 65 IN | DIASTOLIC BLOOD PRESSURE: 72 MMHG | WEIGHT: 218.92 LBS | RESPIRATION RATE: 16 BRPM | BODY MASS INDEX: 36.47 KG/M2 | SYSTOLIC BLOOD PRESSURE: 126 MMHG | HEART RATE: 81 BPM

## 2023-12-17 VITALS
WEIGHT: 210 LBS | BODY MASS INDEX: 39.68 KG/M2 | RESPIRATION RATE: 12 BRPM | SYSTOLIC BLOOD PRESSURE: 118 MMHG | DIASTOLIC BLOOD PRESSURE: 78 MMHG | OXYGEN SATURATION: 97 % | TEMPERATURE: 98.7 F | HEART RATE: 64 BPM

## 2023-12-17 DIAGNOSIS — H10.211 CHEMICAL CONJUNCTIVITIS OF RIGHT EYE: ICD-10-CM

## 2023-12-17 DIAGNOSIS — S05.01XA ABRASION OF RIGHT CORNEA, INITIAL ENCOUNTER: ICD-10-CM

## 2023-12-17 DIAGNOSIS — T15.91XA FOREIGN BODY OF RIGHT EYE, INITIAL ENCOUNTER: ICD-10-CM

## 2023-12-17 PROCEDURE — 99213 OFFICE O/P EST LOW 20 MIN: CPT | Performed by: FAMILY MEDICINE

## 2023-12-17 PROCEDURE — 3074F SYST BP LT 130 MM HG: CPT | Performed by: FAMILY MEDICINE

## 2023-12-17 PROCEDURE — 99283 EMERGENCY DEPT VISIT LOW MDM: CPT

## 2023-12-17 PROCEDURE — 700101 HCHG RX REV CODE 250: Performed by: EMERGENCY MEDICINE

## 2023-12-17 PROCEDURE — 3078F DIAST BP <80 MM HG: CPT | Performed by: FAMILY MEDICINE

## 2023-12-17 RX ORDER — PROPARACAINE HYDROCHLORIDE 5 MG/ML
2 SOLUTION/ DROPS OPHTHALMIC ONCE
Status: COMPLETED | OUTPATIENT
Start: 2023-12-17 | End: 2023-12-17

## 2023-12-17 RX ORDER — ERYTHROMYCIN 5 MG/G
1 OINTMENT OPHTHALMIC 2 TIMES DAILY
Qty: 3.5 G | Refills: 0 | Status: ACTIVE | OUTPATIENT
Start: 2023-12-17 | End: 2023-12-20

## 2023-12-17 RX ADMIN — FLUORESCEIN SODIUM 1 MG: 1 STRIP OPHTHALMIC at 15:30

## 2023-12-17 RX ADMIN — PROPARACAINE HYDROCHLORIDE 2 DROP: 5 SOLUTION/ DROPS OPHTHALMIC at 15:30

## 2023-12-17 ASSESSMENT — FIBROSIS 4 INDEX
FIB4 SCORE: 0.23
FIB4 SCORE: 0.23

## 2023-12-17 NOTE — ED TRIAGE NOTES
Chief Complaint   Patient presents with    Eye Swelling     Right eye swelling since yesterday after use of eyelash glue. Patient reports redness, eye discharge, and blurred vision.

## 2023-12-17 NOTE — ED PROVIDER NOTES
ED Provider Note    CHIEF COMPLAINT  Chief Complaint   Patient presents with    Eye Swelling     Right eye swelling since yesterday after use of eyelash glue. Patient reports redness, eye discharge, and blurred vision.        EXTERNAL RECORDS REVIEWED  Outpatient Notes patient seen at [facility] urgent [facility] earlier today for discomfort in her right eye.  States that she was getting lashes on yesterday when she believes that some of the glue got into her right eye.  Now today continue to have some ongoing irritation and discomfort.    HPI/ROS  LIMITATION TO HISTORY   Select: : None  OUTSIDE HISTORIAN(S):  Friend at bedside providing translation    Julianna Johnson is a 23 y.o. female who presents to the emergency department for right eye discomfort.  Again patient was seen at urgent care earlier today for eye irritation.  Yesterday was getting fake lashes placed and some glue seemingly got into her right eye.  Now having persistent discomfort and slight blurred vision today and therefore presenting to urgent care and then ultimately referred to the ER for further advanced evaluation    PAST MEDICAL HISTORY   has a past medical history of Ovarian cyst.    SURGICAL HISTORY   has a past surgical history that includes exploratory of abdomen (N/A, 6/22/2020) and ovarian cystectomy (Right, 6/22/2020).    FAMILY HISTORY  History reviewed. No pertinent family history.    SOCIAL HISTORY  Social History     Tobacco Use    Smoking status: Never    Smokeless tobacco: Never   Vaping Use    Vaping Use: Some days    Substances: Nicotine, Flavoring   Substance and Sexual Activity    Alcohol use: Not Currently    Drug use: Not Currently    Sexual activity: Not Currently     Partners: Male       CURRENT MEDICATIONS  Home Medications       Reviewed by Denisse Lemus R.N. (Registered Nurse) on 12/17/23 at 1446  Med List Status: Partial     Medication Last Dose Status        Patient Kwame Taking any Medications                      "      ALLERGIES  No Known Allergies    PHYSICAL EXAM  VITAL SIGNS: /75   Pulse 85   Temp 36.3 °C (97.4 °F) (Temporal)   Resp 16   Ht 1.651 m (5' 5\")   Wt 99.3 kg (218 lb 14.7 oz)   LMP 11/17/2023   SpO2 97%   BMI 36.43 kg/m²      Pulse ox interpretation: I interpret this pulse ox as normal.  Constitutional: Alert in no apparent distress.  HENT: No signs of trauma, Bilateral external ears normal, Nose normal.   Eyes: Pupils are equal and reactive    Both Eyes: 20/40  Right Eye: 20/50  Left Eye: 20/40    Right eye: Injected sclera.  No acute changes to lids and lashes.  Slit-lamp exam is benign other than the conjunctival injection.  Additional fluorescein added and there is small amount of dye uptake at the 2 o'clock position of the cornea  No obvious remnant foreign body/visualized    Thorax & Lungs: No respiratory distress  Skin: Warm, Dry, No erythema, No rash.     Neurologic: Alert , Normal motor function, Normal sensory function, No focal deficits noted.   Psychiatric: Affect normal, Judgment normal, Mood normal.         COURSE & MEDICAL DECISION MAKING    ED Observation Status? No; Patient does not meet criteria for ED Observation.     INITIAL ASSESSMENT, COURSE AND PLAN  Care Narrative: 23-year-old female presenting emerged part with above presentation.  Exam as above.  Will prescribe erythromycin ointment.  She is understanding return precautions here to the ER if needed    DISPOSITION AND DISCUSSIONS  I have discussed management of the patient with the following physicians and DIVYA's: None    Discussion of management with other Q or appropriate source(s): None     Escalation of care considered, and ultimately not performed:diagnostic imaging and acute inpatient care management, however at this time, the patient is most appropriate for outpatient management    Barriers to care at this time, including but not limited to:  None .     Decision tools and prescription drugs considered including, but " not limited to: Antibiotics empirically provided for corneal abrasion .    23-year-old female presenting to the emergency room for eye irritation.  Seen urgent care earlier today.  Still have exam as above.  Will prescribe antibiotic ointment.  Patient to return here with any change or worsening    FINAL DIAGNOSIS  1. Chemical conjunctivitis of right eye    2. Abrasion of right cornea, initial encounter           Electronically signed by: Ramon Medina M.D., 12/17/2023 3:11 PM

## 2023-12-17 NOTE — PROGRESS NOTES
Subjective:      23 y.o. female presents to urgent care for right eye abnormalities.  Yesterday at about 1 in the morning she was putting on false eyelashes when she accidentally got the glue in her right eye.  She did vigorously rinse her eye at the time, but is still experiencing irritation to the eye.  She also endorses blurry vision.  She does not wear corrective eyeglasses or contact lenses.    She denies any other questions or concerns at this time.    Current problem list, medication, and past medical/surgical history were reviewed in Epic.    ROS  See HPI     Objective:      /78   Pulse 64   Temp 37.1 °C (98.7 °F) (Temporal)   Resp 12   Wt 95.3 kg (210 lb)   SpO2 97%   BMI 39.68 kg/m²     Physical Exam  Constitutional:       General: She is not in acute distress.     Appearance: She is not diaphoretic.   Eyes:      General:         Right eye: Discharge present.         Left eye: No discharge.      Extraocular Movements: Extraocular movements intact.      Conjunctiva/sclera:      Right eye: Right conjunctiva is injected.      Left eye: Left conjunctiva is not injected.      Pupils: Pupils are equal, round, and reactive to light.   Cardiovascular:      Rate and Rhythm: Normal rate and regular rhythm.      Heart sounds: Normal heart sounds.   Pulmonary:      Effort: Pulmonary effort is normal. No respiratory distress.      Breath sounds: Normal breath sounds.   Neurological:      Mental Status: She is alert.   Psychiatric:         Mood and Affect: Affect normal.         Judgment: Judgment normal.       Assessment/Plan:     1. Foreign body of right eye, initial encounter  Unfortunately, I do not have any irrigation system to clean her eye.  Also do not have a slit-lamp available to me for full evaluation.  At this time, I feel the patient requires a higher level of care in the ED for closer monitoring, stat lab work and/or imaging for further evaluation. This has been discussed with the patient and  she states agreement and understanding. The patient is stable without the need for continuous monitoring and therefore will go via private vehicle to [facility] without delay.        Instructed to return to Urgent Care or nearest Emergency Department if symptoms fail to improve, for any change in condition, further concerns, or new concerning symptoms. Patient states understanding of the plan of care and discharge instructions.    Elina Whatley M.D.

## 2025-01-28 NOTE — DISCHARGE PLANNING
"Patient sleeping, family is here (grandmother and sister) to provide information for initial assessment.    Patient is a 19-year old single female who recently came [place] the [place] [facility] Patient has just started working at [employer] as a , started 11/11. [employer] not starting until 02/2019. Patient did not have prior insurance.     No PCP or medical doctors established. No issues with ADLs or IADLs. No home O2. No AD on file, is full code. No issues with AOD or MH. Upon D/C family will drive her home.    Grandmother, \"Nida\" requested information on insurance and expected hospital bill. Patient is not currently covered with any insurance. No resources to start insurance for this month, as patient works full-time does not qualify for Medicaid or other low-income insurances. Grandmother is worried about large hospital bill. SW provided education that patient may contact [facility] billing once she receives bill to negotiate monthly payments and cost of hospitalizations.     Care Transition Team Assessment    Information Source  Orientation : Oriented x 4  Information Given By: Relative  Informant's Name: Nida (grandmother)  Who is responsible for making decisions for patient? : Patient    Readmission Evaluation  Is this a readmission?: No    Elopement Risk  Legal Hold: No  Ambulatory or Self Mobile in Wheelchair: Yes  Disoriented: No  Psychiatric Symptoms: None  History of Wandering: No  Elopement this Admit: No  Vocalizing Wanting to Leave: No  Displays Behaviors, Body Language Wanting to Leave: No-Not at Risk for Elopement  Elopement Risk: Not at Risk for Elopement         Discharge Preparedness  What is your plan after discharge?: Home with help  What are your discharge supports?: Sibling, Grandparent  Prior Functional Level: Ambulatory, Independent with Activities of Daily Living, Independent with Medication Management  Difficulity with ADLs: None  Difficulity with " ----- Message from Aneudy Johnson MD sent at 1/28/2025  6:00 AM CST -----  Please notify  of results.  The pathology results demonstrated Tubular adenoma.  Follow up in 5 years. Sooner if new symptoms or change in family history.      IADLs: None    Functional Assesment  Prior Functional Level: Ambulatory, Independent with Activities of Daily Living, Independent with Medication Management    Finances  Financial Barriers to Discharge: No  Prescription Coverage: No  Prescription Coverage Comments: No Insurance              Advance Directive  Advance Directive?: None    Domestic Abuse  Have you ever been the victim of abuse or violence?: No    Psychological Assessment  History of Substance Abuse: None  History of Psychiatric Problems: No  Non-compliant with Treatment: No  Newly Diagnosed Illness: Yes    Discharge Risks or Barriers  Discharge risks or barriers?: No PCP, Uninsured / underinsured  Patient risk factors: Language barrier, No PCP, Uninsured or underinsured    Anticipated Discharge Information  Anticipated discharge disposition: Home  Discharge Address: [address] [place] [id number]  Discharge Contact Phone Number: [phone]

## 2025-02-27 ENCOUNTER — HOSPITAL ENCOUNTER (EMERGENCY)
Facility: MEDICAL CENTER | Age: 25
End: 2025-02-27
Attending: STUDENT IN AN ORGANIZED HEALTH CARE EDUCATION/TRAINING PROGRAM
Payer: COMMERCIAL

## 2025-02-27 ENCOUNTER — PHARMACY VISIT (OUTPATIENT)
Dept: PHARMACY | Facility: MEDICAL CENTER | Age: 25
End: 2025-02-27
Payer: COMMERCIAL

## 2025-02-27 VITALS
BODY MASS INDEX: 38.2 KG/M2 | DIASTOLIC BLOOD PRESSURE: 62 MMHG | TEMPERATURE: 97.9 F | HEIGHT: 64 IN | WEIGHT: 223.77 LBS | OXYGEN SATURATION: 97 % | HEART RATE: 88 BPM | SYSTOLIC BLOOD PRESSURE: 118 MMHG | RESPIRATION RATE: 16 BRPM

## 2025-02-27 DIAGNOSIS — R11.2 NAUSEA AND VOMITING, UNSPECIFIED VOMITING TYPE: ICD-10-CM

## 2025-02-27 DIAGNOSIS — J10.1 INFLUENZA A: Primary | ICD-10-CM

## 2025-02-27 LAB
FLUAV RNA SPEC QL NAA+PROBE: POSITIVE
FLUBV RNA SPEC QL NAA+PROBE: NEGATIVE
RSV RNA SPEC QL NAA+PROBE: NEGATIVE
S PYO DNA SPEC NAA+PROBE: NOT DETECTED
SARS-COV-2 RNA RESP QL NAA+PROBE: NOTDETECTED

## 2025-02-27 PROCEDURE — 87651 STREP A DNA AMP PROBE: CPT

## 2025-02-27 PROCEDURE — 99284 EMERGENCY DEPT VISIT MOD MDM: CPT

## 2025-02-27 PROCEDURE — 700102 HCHG RX REV CODE 250 W/ 637 OVERRIDE(OP): Performed by: STUDENT IN AN ORGANIZED HEALTH CARE EDUCATION/TRAINING PROGRAM

## 2025-02-27 PROCEDURE — 0241U HCHG SARS-COV-2 COVID-19 NFCT DS RESP RNA 4 TRGT ED POC: CPT

## 2025-02-27 PROCEDURE — 700111 HCHG RX REV CODE 636 W/ 250 OVERRIDE (IP): Performed by: STUDENT IN AN ORGANIZED HEALTH CARE EDUCATION/TRAINING PROGRAM

## 2025-02-27 PROCEDURE — RXMED WILLOW AMBULATORY MEDICATION CHARGE: Performed by: STUDENT IN AN ORGANIZED HEALTH CARE EDUCATION/TRAINING PROGRAM

## 2025-02-27 PROCEDURE — A9270 NON-COVERED ITEM OR SERVICE: HCPCS | Performed by: STUDENT IN AN ORGANIZED HEALTH CARE EDUCATION/TRAINING PROGRAM

## 2025-02-27 RX ORDER — IBUPROFEN 400 MG/1
400 TABLET, FILM COATED ORAL EVERY 6 HOURS PRN
Qty: 30 TABLET | Refills: 0 | Status: SHIPPED | OUTPATIENT
Start: 2025-02-27

## 2025-02-27 RX ORDER — IBUPROFEN 600 MG/1
600 TABLET, FILM COATED ORAL ONCE
Status: COMPLETED | OUTPATIENT
Start: 2025-02-27 | End: 2025-02-27

## 2025-02-27 RX ORDER — BENZONATATE 100 MG/1
100 CAPSULE ORAL 3 TIMES DAILY PRN
Qty: 30 CAPSULE | Refills: 0 | Status: SHIPPED | OUTPATIENT
Start: 2025-02-27

## 2025-02-27 RX ORDER — ONDANSETRON 4 MG/1
4 TABLET, ORALLY DISINTEGRATING ORAL ONCE
Status: COMPLETED | OUTPATIENT
Start: 2025-02-27 | End: 2025-02-27

## 2025-02-27 RX ORDER — OSELTAMIVIR PHOSPHATE 75 MG/1
75 CAPSULE ORAL 2 TIMES DAILY
Qty: 10 CAPSULE | Refills: 0 | Status: ACTIVE | OUTPATIENT
Start: 2025-02-27

## 2025-02-27 RX ORDER — ACETAMINOPHEN 325 MG/1
650 TABLET ORAL EVERY 6 HOURS PRN
Qty: 30 TABLET | Refills: 0 | Status: SHIPPED | OUTPATIENT
Start: 2025-02-27

## 2025-02-27 RX ORDER — ONDANSETRON 4 MG/1
4 TABLET, ORALLY DISINTEGRATING ORAL EVERY 6 HOURS PRN
Qty: 10 TABLET | Refills: 0 | Status: SHIPPED | OUTPATIENT
Start: 2025-02-27

## 2025-02-27 RX ORDER — ACETAMINOPHEN 325 MG/1
650 TABLET ORAL ONCE
Status: COMPLETED | OUTPATIENT
Start: 2025-02-27 | End: 2025-02-27

## 2025-02-27 RX ADMIN — ACETAMINOPHEN 650 MG: 325 TABLET ORAL at 21:06

## 2025-02-27 RX ADMIN — ONDANSETRON 4 MG: 4 TABLET, ORALLY DISINTEGRATING ORAL at 21:07

## 2025-02-27 RX ADMIN — IBUPROFEN 600 MG: 600 TABLET, FILM COATED ORAL at 21:07

## 2025-02-27 ASSESSMENT — FIBROSIS 4 INDEX: FIB4 SCORE: 0.24

## 2025-02-28 ENCOUNTER — HOSPITAL ENCOUNTER (EMERGENCY)
Facility: MEDICAL CENTER | Age: 25
End: 2025-02-28
Attending: EMERGENCY MEDICINE
Payer: COMMERCIAL

## 2025-02-28 ENCOUNTER — APPOINTMENT (OUTPATIENT)
Dept: RADIOLOGY | Facility: MEDICAL CENTER | Age: 25
End: 2025-02-28
Attending: EMERGENCY MEDICINE
Payer: COMMERCIAL

## 2025-02-28 ENCOUNTER — PHARMACY VISIT (OUTPATIENT)
Dept: PHARMACY | Facility: MEDICAL CENTER | Age: 25
End: 2025-02-28
Payer: COMMERCIAL

## 2025-02-28 ENCOUNTER — HOSPITAL ENCOUNTER (EMERGENCY)
Facility: MEDICAL CENTER | Age: 25
End: 2025-02-28
Attending: STUDENT IN AN ORGANIZED HEALTH CARE EDUCATION/TRAINING PROGRAM
Payer: COMMERCIAL

## 2025-02-28 VITALS
WEIGHT: 98.9 LBS | TEMPERATURE: 97.6 F | OXYGEN SATURATION: 95 % | BODY MASS INDEX: 16.88 KG/M2 | DIASTOLIC BLOOD PRESSURE: 57 MMHG | HEIGHT: 64 IN | SYSTOLIC BLOOD PRESSURE: 117 MMHG | RESPIRATION RATE: 20 BRPM | HEART RATE: 77 BPM

## 2025-02-28 VITALS
OXYGEN SATURATION: 92 % | RESPIRATION RATE: 16 BRPM | WEIGHT: 220.46 LBS | HEIGHT: 65 IN | TEMPERATURE: 96.9 F | HEART RATE: 75 BPM | SYSTOLIC BLOOD PRESSURE: 138 MMHG | DIASTOLIC BLOOD PRESSURE: 70 MMHG | BODY MASS INDEX: 36.73 KG/M2

## 2025-02-28 DIAGNOSIS — E66.9 OBESITY (BMI 30-39.9): ICD-10-CM

## 2025-02-28 DIAGNOSIS — K22.6 MALLORY-WEISS TEAR: ICD-10-CM

## 2025-02-28 DIAGNOSIS — R11.2 NAUSEA AND VOMITING, UNSPECIFIED VOMITING TYPE: ICD-10-CM

## 2025-02-28 DIAGNOSIS — J11.1 INFLUENZA: ICD-10-CM

## 2025-02-28 DIAGNOSIS — J10.1 INFLUENZA A: ICD-10-CM

## 2025-02-28 DIAGNOSIS — E87.6 HYPOKALEMIA: ICD-10-CM

## 2025-02-28 LAB
ALBUMIN SERPL BCP-MCNC: 4.3 G/DL (ref 3.2–4.9)
ALBUMIN SERPL BCP-MCNC: 4.4 G/DL (ref 3.2–4.9)
ALBUMIN/GLOB SERPL: 1.1 G/DL
ALBUMIN/GLOB SERPL: 1.2 G/DL
ALP SERPL-CCNC: 82 U/L (ref 30–99)
ALP SERPL-CCNC: 84 U/L (ref 30–99)
ALT SERPL-CCNC: 35 U/L (ref 2–50)
ALT SERPL-CCNC: 46 U/L (ref 2–50)
ANION GAP SERPL CALC-SCNC: 14 MMOL/L (ref 7–16)
ANION GAP SERPL CALC-SCNC: 17 MMOL/L (ref 7–16)
APPEARANCE UR: CLEAR
AST SERPL-CCNC: 24 U/L (ref 12–45)
AST SERPL-CCNC: 31 U/L (ref 12–45)
BACTERIA #/AREA URNS HPF: ABNORMAL /HPF
BASOPHILS # BLD AUTO: 0.3 % (ref 0–1.8)
BASOPHILS # BLD: 0.03 K/UL (ref 0–0.12)
BILIRUB SERPL-MCNC: 0.4 MG/DL (ref 0.1–1.5)
BILIRUB SERPL-MCNC: 0.4 MG/DL (ref 0.1–1.5)
BILIRUB UR QL STRIP.AUTO: NEGATIVE
BUN SERPL-MCNC: 13 MG/DL (ref 8–22)
BUN SERPL-MCNC: 13 MG/DL (ref 8–22)
CALCIUM ALBUM COR SERPL-MCNC: 8.8 MG/DL (ref 8.5–10.5)
CALCIUM ALBUM COR SERPL-MCNC: 8.9 MG/DL (ref 8.5–10.5)
CALCIUM SERPL-MCNC: 9.1 MG/DL (ref 8.5–10.5)
CALCIUM SERPL-MCNC: 9.1 MG/DL (ref 8.5–10.5)
CASTS URNS QL MICRO: ABNORMAL /LPF (ref 0–2)
CHLORIDE SERPL-SCNC: 101 MMOL/L (ref 96–112)
CHLORIDE SERPL-SCNC: 102 MMOL/L (ref 96–112)
CO2 SERPL-SCNC: 18 MMOL/L (ref 20–33)
CO2 SERPL-SCNC: 23 MMOL/L (ref 20–33)
COLOR UR: YELLOW
CREAT SERPL-MCNC: 0.67 MG/DL (ref 0.5–1.4)
CREAT SERPL-MCNC: 0.76 MG/DL (ref 0.5–1.4)
EOSINOPHIL # BLD AUTO: 0.03 K/UL (ref 0–0.51)
EOSINOPHIL NFR BLD: 0.3 % (ref 0–6.9)
EPITHELIAL CELLS 1715: ABNORMAL /HPF (ref 0–5)
ERYTHROCYTE [DISTWIDTH] IN BLOOD BY AUTOMATED COUNT: 39.1 FL (ref 35.9–50)
GFR SERPLBLD CREATININE-BSD FMLA CKD-EPI: 112 ML/MIN/1.73 M 2
GFR SERPLBLD CREATININE-BSD FMLA CKD-EPI: 125 ML/MIN/1.73 M 2
GLOBULIN SER CALC-MCNC: 3.7 G/DL (ref 1.9–3.5)
GLOBULIN SER CALC-MCNC: 4 G/DL (ref 1.9–3.5)
GLUCOSE SERPL-MCNC: 124 MG/DL (ref 65–99)
GLUCOSE SERPL-MCNC: 182 MG/DL (ref 65–99)
GLUCOSE UR STRIP.AUTO-MCNC: NEGATIVE MG/DL
HCG SERPL QL: NEGATIVE
HCG SERPL QL: NEGATIVE
HCT VFR BLD AUTO: 42.2 % (ref 37–47)
HGB BLD-MCNC: 14 G/DL (ref 12–16)
IMM GRANULOCYTES # BLD AUTO: 0.06 K/UL (ref 0–0.11)
IMM GRANULOCYTES NFR BLD AUTO: 0.5 % (ref 0–0.9)
KETONES UR STRIP.AUTO-MCNC: NEGATIVE MG/DL
LEUKOCYTE ESTERASE UR QL STRIP.AUTO: NEGATIVE
LIPASE SERPL-CCNC: 18 U/L (ref 11–82)
LYMPHOCYTES # BLD AUTO: 0.84 K/UL (ref 1–4.8)
LYMPHOCYTES NFR BLD: 7.3 % (ref 22–41)
MCH RBC QN AUTO: 27 PG (ref 27–33)
MCHC RBC AUTO-ENTMCNC: 33.2 G/DL (ref 32.2–35.5)
MCV RBC AUTO: 81.3 FL (ref 81.4–97.8)
MICRO URNS: ABNORMAL
MONOCYTES # BLD AUTO: 0.42 K/UL (ref 0–0.85)
MONOCYTES NFR BLD AUTO: 3.6 % (ref 0–13.4)
NEUTROPHILS # BLD AUTO: 10.15 K/UL (ref 1.82–7.42)
NEUTROPHILS NFR BLD: 88 % (ref 44–72)
NITRITE UR QL STRIP.AUTO: NEGATIVE
NRBC # BLD AUTO: 0 K/UL
NRBC BLD-RTO: 0 /100 WBC (ref 0–0.2)
PH UR STRIP.AUTO: 7 [PH] (ref 5–8)
PLATELET # BLD AUTO: 332 K/UL (ref 164–446)
PMV BLD AUTO: 9.4 FL (ref 9–12.9)
POTASSIUM SERPL-SCNC: 3.5 MMOL/L (ref 3.6–5.5)
POTASSIUM SERPL-SCNC: 3.7 MMOL/L (ref 3.6–5.5)
PROT SERPL-MCNC: 8 G/DL (ref 6–8.2)
PROT SERPL-MCNC: 8.4 G/DL (ref 6–8.2)
PROT UR QL STRIP: 30 MG/DL
RBC # BLD AUTO: 5.19 M/UL (ref 4.2–5.4)
RBC # URNS HPF: ABNORMAL /HPF (ref 0–2)
RBC UR QL AUTO: ABNORMAL
SODIUM SERPL-SCNC: 137 MMOL/L (ref 135–145)
SODIUM SERPL-SCNC: 138 MMOL/L (ref 135–145)
SP GR UR STRIP.AUTO: 1.03
UROBILINOGEN UR STRIP.AUTO-MCNC: 1 EU/DL
WBC # BLD AUTO: 11.5 K/UL (ref 4.8–10.8)
WBC #/AREA URNS HPF: ABNORMAL /HPF

## 2025-02-28 PROCEDURE — 99285 EMERGENCY DEPT VISIT HI MDM: CPT

## 2025-02-28 PROCEDURE — 700111 HCHG RX REV CODE 636 W/ 250 OVERRIDE (IP): Mod: JZ | Performed by: STUDENT IN AN ORGANIZED HEALTH CARE EDUCATION/TRAINING PROGRAM

## 2025-02-28 PROCEDURE — 36415 COLL VENOUS BLD VENIPUNCTURE: CPT

## 2025-02-28 PROCEDURE — 71045 X-RAY EXAM CHEST 1 VIEW: CPT

## 2025-02-28 PROCEDURE — 700102 HCHG RX REV CODE 250 W/ 637 OVERRIDE(OP): Performed by: EMERGENCY MEDICINE

## 2025-02-28 PROCEDURE — 80053 COMPREHEN METABOLIC PANEL: CPT

## 2025-02-28 PROCEDURE — 84703 CHORIONIC GONADOTROPIN ASSAY: CPT | Mod: 91

## 2025-02-28 PROCEDURE — 96374 THER/PROPH/DIAG INJ IV PUSH: CPT

## 2025-02-28 PROCEDURE — 700111 HCHG RX REV CODE 636 W/ 250 OVERRIDE (IP): Mod: JZ | Performed by: EMERGENCY MEDICINE

## 2025-02-28 PROCEDURE — 700105 HCHG RX REV CODE 258: Performed by: STUDENT IN AN ORGANIZED HEALTH CARE EDUCATION/TRAINING PROGRAM

## 2025-02-28 PROCEDURE — 96375 TX/PRO/DX INJ NEW DRUG ADDON: CPT

## 2025-02-28 PROCEDURE — 85025 COMPLETE CBC W/AUTO DIFF WBC: CPT

## 2025-02-28 PROCEDURE — 81001 URINALYSIS AUTO W/SCOPE: CPT

## 2025-02-28 PROCEDURE — RXMED WILLOW AMBULATORY MEDICATION CHARGE: Performed by: STUDENT IN AN ORGANIZED HEALTH CARE EDUCATION/TRAINING PROGRAM

## 2025-02-28 PROCEDURE — 99284 EMERGENCY DEPT VISIT MOD MDM: CPT

## 2025-02-28 PROCEDURE — 83690 ASSAY OF LIPASE: CPT

## 2025-02-28 PROCEDURE — 80053 COMPREHEN METABOLIC PANEL: CPT | Mod: 91

## 2025-02-28 PROCEDURE — A9270 NON-COVERED ITEM OR SERVICE: HCPCS | Performed by: EMERGENCY MEDICINE

## 2025-02-28 PROCEDURE — 700105 HCHG RX REV CODE 258: Performed by: EMERGENCY MEDICINE

## 2025-02-28 PROCEDURE — 84703 CHORIONIC GONADOTROPIN ASSAY: CPT

## 2025-02-28 RX ORDER — SODIUM CHLORIDE 9 MG/ML
1000 INJECTION, SOLUTION INTRAVENOUS ONCE
Status: COMPLETED | OUTPATIENT
Start: 2025-02-28 | End: 2025-02-28

## 2025-02-28 RX ORDER — POTASSIUM CHLORIDE 1500 MG/1
20 TABLET, EXTENDED RELEASE ORAL ONCE
Status: COMPLETED | OUTPATIENT
Start: 2025-02-28 | End: 2025-02-28

## 2025-02-28 RX ORDER — PROCHLORPERAZINE EDISYLATE 5 MG/ML
10 INJECTION INTRAMUSCULAR; INTRAVENOUS ONCE
Status: COMPLETED | OUTPATIENT
Start: 2025-02-28 | End: 2025-02-28

## 2025-02-28 RX ORDER — HALOPERIDOL 5 MG/ML
1 INJECTION INTRAMUSCULAR ONCE
Status: COMPLETED | OUTPATIENT
Start: 2025-02-28 | End: 2025-02-28

## 2025-02-28 RX ORDER — METOCLOPRAMIDE HYDROCHLORIDE 5 MG/ML
10 INJECTION INTRAMUSCULAR; INTRAVENOUS ONCE
Status: COMPLETED | OUTPATIENT
Start: 2025-02-28 | End: 2025-02-28

## 2025-02-28 RX ORDER — ONDANSETRON 2 MG/ML
4 INJECTION INTRAMUSCULAR; INTRAVENOUS ONCE
Status: COMPLETED | OUTPATIENT
Start: 2025-02-28 | End: 2025-02-28

## 2025-02-28 RX ORDER — KETOROLAC TROMETHAMINE 15 MG/ML
15 INJECTION, SOLUTION INTRAMUSCULAR; INTRAVENOUS ONCE
Status: COMPLETED | OUTPATIENT
Start: 2025-02-28 | End: 2025-02-28

## 2025-02-28 RX ORDER — PROCHLORPERAZINE MALEATE 10 MG
10 TABLET ORAL EVERY 6 HOURS PRN
Qty: 30 TABLET | Refills: 3 | Status: SHIPPED | OUTPATIENT
Start: 2025-02-28

## 2025-02-28 RX ORDER — DIPHENHYDRAMINE HYDROCHLORIDE 50 MG/ML
10 INJECTION, SOLUTION INTRAMUSCULAR; INTRAVENOUS ONCE
Status: COMPLETED | OUTPATIENT
Start: 2025-02-28 | End: 2025-02-28

## 2025-02-28 RX ADMIN — ONDANSETRON 4 MG: 2 INJECTION INTRAMUSCULAR; INTRAVENOUS at 21:24

## 2025-02-28 RX ADMIN — SODIUM CHLORIDE 1000 ML: 9 INJECTION, SOLUTION INTRAVENOUS at 03:45

## 2025-02-28 RX ADMIN — PROCHLORPERAZINE EDISYLATE 10 MG: 5 INJECTION INTRAMUSCULAR; INTRAVENOUS at 03:45

## 2025-02-28 RX ADMIN — SODIUM CHLORIDE 1000 ML: 9 INJECTION, SOLUTION INTRAVENOUS at 21:05

## 2025-02-28 RX ADMIN — KETOROLAC TROMETHAMINE 15 MG: 15 INJECTION, SOLUTION INTRAMUSCULAR; INTRAVENOUS at 21:23

## 2025-02-28 RX ADMIN — FAMOTIDINE 20 MG: 10 INJECTION, SOLUTION INTRAVENOUS at 21:24

## 2025-02-28 RX ADMIN — DIPHENHYDRAMINE HYDROCHLORIDE 10 MG: 50 INJECTION, SOLUTION INTRAMUSCULAR; INTRAVENOUS at 04:56

## 2025-02-28 RX ADMIN — METOCLOPRAMIDE HYDROCHLORIDE 10 MG: 5 INJECTION INTRAMUSCULAR; INTRAVENOUS at 04:57

## 2025-02-28 RX ADMIN — HALOPERIDOL LACTATE 1 MG: 5 INJECTION, SOLUTION INTRAMUSCULAR at 04:57

## 2025-02-28 RX ADMIN — POTASSIUM CHLORIDE 20 MEQ: 1500 TABLET, EXTENDED RELEASE ORAL at 22:22

## 2025-02-28 ASSESSMENT — PAIN DESCRIPTION - PAIN TYPE
TYPE: ACUTE PAIN
TYPE: ACUTE PAIN

## 2025-02-28 ASSESSMENT — FIBROSIS 4 INDEX
FIB4 SCORE: 0.33
FIB4 SCORE: 0.24

## 2025-02-28 NOTE — ED TRIAGE NOTES
"Chief Complaint   Patient presents with    N/V     Px c/o NV since this morning and sore throat for a couple of days       23 yo female to triage for above complaint. Ambulatory. Strep swab obtained in triage.     Pt is alert and oriented, speaking in full sentences, follows commands and responds appropriately to questions.     Patient placed back in lobby and educated on triage process. Asked to inform RN of any changes.    BP (!) 142/63   Pulse (!) 116   Temp 36.6 °C (97.9 °F) (Temporal)   Resp 16   Ht 1.626 m (5' 4\")   Wt 101 kg (223 lb 12.3 oz)   SpO2 97%   BMI 38.41 kg/m²     "

## 2025-02-28 NOTE — ED NOTES
Discharge instructions provided to pt, all questions answered, pt verbalized understanding.  Ambulated to lobby with steady gait.

## 2025-02-28 NOTE — ED PROVIDER NOTES
ED Provider Note    CHIEF COMPLAINT  Chief Complaint   Patient presents with    N/V    Blood in Vomit       EXTERNAL RECORDS REVIEWED  Patient was seen here yesterday and diagnosed with influenza A.    HPI/ROS  LIMITATION TO HISTORY   none  OUTSIDE HISTORIAN(S):  Partner at bedside    Julianna Johnson is a 24 y.o. female who presents with nausea and vomiting.  She was seen here yesterday diagnosed with influenza A.  She was discharged with Tamiflu and antiemetics.  Her partner says that she has been vomiting since 11 PM this evening her last episodes of vomiting have been blood-tinged.  No bilious emesis.  She does have some epigastric tenderness.  No fevers no other upper respiratory symptoms.  She does feel weak and achy.  She has been unable to tolerate any oral intake.    PAST MEDICAL HISTORY   has a past medical history of Ovarian cyst.    SURGICAL HISTORY   has a past surgical history that includes exploratory of abdomen (N/A, 6/22/2020) and ovarian cystectomy (Right, 6/22/2020).    FAMILY HISTORY  No family history on file.    SOCIAL HISTORY  Social History     Tobacco Use    Smoking status: Never    Smokeless tobacco: Never   Vaping Use    Vaping status: Some Days    Substances: Nicotine, Flavoring   Substance and Sexual Activity    Alcohol use: Not Currently    Drug use: Not Currently    Sexual activity: Not Currently     Partners: Male       CURRENT MEDICATIONS  Home Medications       Reviewed by Sarina Yeung R.N. (Registered Nurse) on 02/28/25 at 0318  Med List Status: Partial     Medication Last Dose Status   acetaminophen (TYLENOL) 325 MG Tab  Active   benzonatate (TESSALON) 100 MG Cap  Active   ibuprofen (MOTRIN) 400 MG Tab  Active   ondansetron (ZOFRAN ODT) 4 MG TABLET DISPERSIBLE  Active   oseltamivir (TAMIFLU) 75 MG Cap  Active                    ALLERGIES  No Known Allergies    PHYSICAL EXAM  VITAL SIGNS: /62   Pulse 90   Temp 36.2 °C (97.2 °F) (Temporal)   Resp 20   Ht 1.626  "m (5' 4\")   Wt 44.9 kg (98 lb 14.4 oz)   LMP 02/25/2025 (Exact Date)   SpO2 94%   BMI 16.98 kg/m²    Constitutional: Awake and alert non toxic  HENT: Normal inspection  Dry mucous membranes  Eyes: Normal inspection  Neck: Grossly normal range of motion.  Cardiovascular: Normal heart rate, Normal rhythm.  Symmetric peripheral pulses.   Thorax & Lungs: No respiratory distress, No wheezing, No rales, No rhonchi, No chest tenderness.   Abdomen: Soft, non-distended, nontender to palpation in all 4 quadrants, no mass  Skin: No obvious rash.  Extremities: Warm, well perfused. No clubbing, cyanosis, edema   Neurologic: Grossly normal   Psychiatric: Normal for situation      EKG/LABS  Results for orders placed or performed during the hospital encounter of 02/28/25   CBC WITH DIFFERENTIAL    Collection Time: 02/28/25  3:42 AM   Result Value Ref Range    WBC 11.5 (H) 4.8 - 10.8 K/uL    RBC 5.19 4.20 - 5.40 M/uL    Hemoglobin 14.0 12.0 - 16.0 g/dL    Hematocrit 42.2 37.0 - 47.0 %    MCV 81.3 (L) 81.4 - 97.8 fL    MCH 27.0 27.0 - 33.0 pg    MCHC 33.2 32.2 - 35.5 g/dL    RDW 39.1 35.9 - 50.0 fL    Platelet Count 332 164 - 446 K/uL    MPV 9.4 9.0 - 12.9 fL    Neutrophils-Polys 88.00 (H) 44.00 - 72.00 %    Lymphocytes 7.30 (L) 22.00 - 41.00 %    Monocytes 3.60 0.00 - 13.40 %    Eosinophils 0.30 0.00 - 6.90 %    Basophils 0.30 0.00 - 1.80 %    Immature Granulocytes 0.50 0.00 - 0.90 %    Nucleated RBC 0.00 0.00 - 0.20 /100 WBC    Neutrophils (Absolute) 10.15 (H) 1.82 - 7.42 K/uL    Lymphs (Absolute) 0.84 (L) 1.00 - 4.80 K/uL    Monos (Absolute) 0.42 0.00 - 0.85 K/uL    Eos (Absolute) 0.03 0.00 - 0.51 K/uL    Baso (Absolute) 0.03 0.00 - 0.12 K/uL    Immature Granulocytes (abs) 0.06 0.00 - 0.11 K/uL    NRBC (Absolute) 0.00 K/uL   COMP METABOLIC PANEL    Collection Time: 02/28/25  3:42 AM   Result Value Ref Range    Sodium 137 135 - 145 mmol/L    Potassium 3.7 3.6 - 5.5 mmol/L    Chloride 102 96 - 112 mmol/L    Co2 18 (L) 20 - 33 " mmol/L    Anion Gap 17.0 (H) 7.0 - 16.0    Glucose 182 (H) 65 - 99 mg/dL    Bun 13 8 - 22 mg/dL    Creatinine 0.76 0.50 - 1.40 mg/dL    Calcium 9.1 8.5 - 10.5 mg/dL    Correct Calcium 8.8 8.5 - 10.5 mg/dL    AST(SGOT) 31 12 - 45 U/L    ALT(SGPT) 46 2 - 50 U/L    Alkaline Phosphatase 84 30 - 99 U/L    Total Bilirubin 0.4 0.1 - 1.5 mg/dL    Albumin 4.4 3.2 - 4.9 g/dL    Total Protein 8.4 (H) 6.0 - 8.2 g/dL    Globulin 4.0 (H) 1.9 - 3.5 g/dL    A-G Ratio 1.1 g/dL   HCG QUAL SERUM    Collection Time: 02/28/25  3:42 AM   Result Value Ref Range    Beta-Hcg Qualitative Serum Negative Negative   ESTIMATED GFR    Collection Time: 02/28/25  3:42 AM   Result Value Ref Range    GFR (CKD-EPI) 112 >60 mL/min/1.73 m 2     COURSE & MEDICAL DECISION MAKING    ASSESSMENT, COURSE AND PLAN  Care Narrative: This is a 24-year-old, previously healthy female who was diagnosed yesterday with influenza A.  She presents tonight she is having persistent nausea and vomiting.  She did have streaks of blood in her vomit as well.  She has no associated abdominal pain and no tenderness on exam.  Her labs are notable for leukocytosis of 11.5, she is also dehydrated with a metabolic anion gap acidosis.  She is not pregnant.  I suspect most likely her vomiting is in the setting of known influenza A illness.  She was given Compazine without improvement.  She was then given Haldol, Benadryl, Reglan and IV fluids and after this reports significant improvement. She has not had any further vomiting in the ER.  She has been able to tolerate oral intake.  She continues to have normal vital signs and looks well.  Repeat exam is benign.  She did have some blood tinged vomit which I suspect was due to Pam-Scales tears in the setting of frequent emesis.  No coffee-ground emesis or other signs to suggest GI bleed.    She understands to return if she has persistent vomiting, inability to eat or drink or other concern I will send a prescription for  Compazine.    Hydration: Based on the patient's presentation of Acute Vomiting the patient was given IV fluids. IV Hydration was used because oral hydration was not adequate alone. Upon recheck following hydration, the patient was improved.          DISPOSITION AND DISCUSSIONS  I have discussed management of the patient with the following physicians and DIVYA's:  none    Discussion of management with other QHP or appropriate source(s): None     Escalation of care considered, and ultimately not performed:diagnostic imaging given benign exam no complaints of pain and no focality I have very low suspicion for appendicitis, bowel obstruction or other emergent etiology.      Barriers to care at this time, including but not limited to:  none .     Decision tools and prescription drugs considered including, but not limited to:  none .    FINAL DIAGNOSIS  1. Nausea and vomiting, unspecified vomiting type Acute   2. Pam-Scales tear Acute   3. Influenza A Acute        Electronically signed by: Amara Lazo M.D., 2/28/2025 3:27 AM

## 2025-02-28 NOTE — ED TRIAGE NOTES
"Chief Complaint   Patient presents with    N/V    Blood in Vomit     Ambulatory to triage; patient was seen here yesterday; was diagnosed with Flu;states she's been vomiting since last night, noted blood in vomit, was d/c with medications but not working per family.    States she feels very weak and very nauseous. Unable to keep anything down.    /82   Pulse 89   Temp 36.2 °C (97.2 °F) (Temporal)   Resp 20   Ht 1.626 m (5' 4\")   Wt 44.9 kg (98 lb 14.4 oz)   SpO2 95%   BMI 16.98 kg/m²     "

## 2025-02-28 NOTE — DISCHARGE INSTRUCTIONS
You are seen and evaluated Emergency Department for your symptoms.  Your viral studies show that you have influenza A.  You will be prescribed Tamiflu, other symptomatic medications.  Make sure you are increasing your oral rehydration at home and we will prescribe some medication for nausea and vomiting.  The antiviral that you be will be prescribed has been shown to mildly shorten duration of illness although people do have some side effects from this.  If you feel like you are experience any side effects that recommend stopping the medication, otherwise follow-up with your primary care doctor, return to the emergency department for any other concerning symptoms including but not limited to intractable nausea vomiting, fevers not responsive to over-the-counter medications, difficulty breathing.

## 2025-02-28 NOTE — ED NOTES
Vital signs taken and recorded. IV removed. Discharge in stable condition ambulatory accompanied by father. Health teachings given to patient and family with full understanding of the information given. No personal belongings left.    Home medication provided  Sick note provided

## 2025-02-28 NOTE — ED PROVIDER NOTES
ER Provider Note    Scribed for Aguila Trammell M.d. by Ramonita Loyd. 2/27/2025  8:57 PM    Primary Care Provider: ROWENA Dash    CHIEF COMPLAINT   Chief Complaint   Patient presents with    N/V     Px c/o NV since this morning and sore throat for a couple of days     EXTERNAL RECORDS REVIEWED  Reviewed ED note where patient was seen for eye swelling, blurred vision, was prescribed antibiotic ointment and discharged home to care of self.    HPI/ROS  LIMITATION TO HISTORY   Select: : None  OUTSIDE HISTORIAN(S):  None    Julianna Johnson is a 24 y.o. female who presents to the ED complaining of sore throat with associated symptoms of nausea and vomiting onset earlier today. She adds she has been coughing, noting she has back pain when she coughs. Patient notes nausea secondary to coughing. She denies fevers, difficulty swallowing, shortness of breath or ear pain  Patient reports taking over the counter medication with no alleviation. She has history of ovarian cyst.    PAST MEDICAL HISTORY  Past Medical History:   Diagnosis Date    Ovarian cyst        SURGICAL HISTORY  Past Surgical History:   Procedure Laterality Date    LA EXPLORATORY OF ABDOMEN N/A 6/22/2020    Procedure: MINILAPAROTOMY;  Surgeon: Mary Jesus M.D.;  Location: SURGERY SAME DAY [facility] ORS;  Service: Labor and Delivery    OVARIAN CYSTECTOMY Right 6/22/2020    Procedure: EXCISION, CYST, OVARY;  Surgeon: Mary Jesus M.D.;  Location: SURGERY SAME DAY [facility] ORS;  Service: Labor and Delivery       FAMILY HISTORY  No family history noted.     SOCIAL HISTORY   reports that she has never smoked. She has never used smokeless tobacco. She reports that she does not currently use alcohol. She reports that she does not currently use drugs.    CURRENT MEDICATIONS  Previous Medications    No medications on file       ALLERGIES  Patient has no known allergies.    PHYSICAL EXAM  BP (!) 142/63   " Pulse (!) 116   Temp 36.6 °C (97.9 °F) (Temporal)   Resp 16   Ht 1.626 m (5' 4\")   Wt 101 kg (223 lb 12.3 oz)   SpO2 97%   BMI 38.41 kg/m²     Constitutional: Well developed, Well nourished, No acute distress, Non-toxic appearance.   HENT: Normocephalic, Atraumatic, Bilateral external ears normal, Oropharynx is clear mucous membranes are moist. No oral exudates or nasal discharge. Tonsils are mildly swollen, mildly erythematous, no lymphoadenopathy, tympanic membrane are normal bilaterally.   Eyes: Pupils are equal round and reactive, EOMI, Conjunctiva normal, No discharge.   Neck: Normal range of motion, No tenderness, Supple, No stridor. No meningismus.  Lymphatic: No lymphadenopathy noted.   Cardiovascular: Regular rate and rhythm without murmur rub or gallop.  Thorax & Lungs: Clear breath sounds bilaterally without wheezes, rhonchi or rales. There is no chest wall tenderness.   Abdomen: Soft non-tender non-distended. There is no rebound or guarding. No organomegaly is appreciated. Bowel sounds are normal.  Skin: Normal without rash.   Back: No CVA or spinal tenderness.   Extremities: Intact distal pulses, No edema, No tenderness, No cyanosis, No clubbing. Capillary refill is less than 2 seconds.  Musculoskeletal: Good range of motion in all major joints. No tenderness to palpation or major deformities noted.   Neurologic: Alert & oriented x 3, Normal motor function, Normal sensory function, No focal deficits noted. Reflexes are normal.  Psychiatric: Affect normal, Judgment normal, Mood normal. There is no suicidal ideation or patient reported hallucinations.      DIAGNOSTIC STUDIES    LABS  Labs Reviewed   POC COV-2, FLU A/B, RSV BY PCR - Abnormal; Notable for the following components:       Result Value    POC Influenza A RNA, PCR POSITIVE (*)     All other components within normal limits   GROUP A STREP BY PCR   POCT COV-2, FLU A/B, RSV BY PCR     COURSE & MEDICAL DECISION MAKING     ASSESSMENT, COURSE " AND PLAN  Care Narrative: This is a 24 year old female who presents to the ED for sore throat, cough, body aches with associated symptoms of nausea and vomiting onset earlier today.  Patient arrived and was mildly tachycardic, no respiratory distress, otherwise hemodynamically stable and afebrile here.  No significant tonsillar swelling, exudate, lymphadenopathy, no respiratory distress or increased work of breathing.  I will order SARS-CoV-2, Flu A/B, RSV and Group A strep by PCR and PO Challenge to evaluate. Patient verbalizes understanding and agreement to this plan of care.      Labs reviewed that showed a positive influenza A.  Differential diagnosis considered.  Patient presentation consistent with influenza A infection, will be prescribed antivirals, symptomatic medications.    10:13 PM - I reassessed the patient at this time. I dicussed her lab results and informed her she is positive for influenza A. Patient had the opportunity to ask any questions. The plan for discharge was discussed with them and they were told to return for any new or worsening symptoms. She was also informed of the plans for follow up. Patient is understanding and agreeable to the plan for discharge.    ADDITIONAL PROBLEM LIST  None    DISPOSITION AND DISCUSSIONS  I have discussed management of the patient with the following physicians and DIVYA's:  None    Discussion of management with other QHP or appropriate source(s): None     Escalation of care considered, and ultimately not performed: Laboratory analysis and diagnostic imaging.    Barriers to care at this time, including but not limited to:  None .     Decision tools and prescription drugs considered including, but not limited to: Antivirals   .    The patient will return for new or worsening symptoms and is stable at the time of discharge.    The patient is referred to a primary physician for blood pressure management, diabetic screening, and for all other preventative health  concerns.    DISPOSITION:  Patient will be discharged home in stable condition.    FOLLOW UP:  Cara Wyman A[facility]  [address]  [address]  [place] [address] [phone]    Schedule an appointment as soon as possible for a visit       OUTPATIENT MEDICATIONS:  New Prescriptions    ACETAMINOPHEN (TYLENOL) 325 MG TAB    Take 2 Tablets by mouth every 6 hours as needed for Mild Pain or Fever.    BENZONATATE (TESSALON) 100 MG CAP    Take 1 Capsule by mouth 3 times a day as needed for Cough.    IBUPROFEN (MOTRIN) 400 MG TAB    Take 1 Tablet by mouth every 6 hours as needed for Moderate Pain.    ONDANSETRON (ZOFRAN ODT) 4 MG TABLET DISPERSIBLE    Take 1 Tablet by mouth every 6 hours as needed for Nausea/Vomiting.    OSELTAMIVIR (TAMIFLU) 75 MG CAP    Take 1 Capsule by mouth 2 times a day.        FINAL DIANGOSIS  1. Influenza A Acute   2. Nausea and vomiting, unspecified vomiting type Acute     IRamonita (Nelia), am scribing for, and in the presence of, Aguila Trammell M.D..    Electronically signed by: Ramonita Borjas), 2/27/2025    IAguila M.D. personally performed the services described in this documentation, as scribed by Ramonita Loyd in my presence, and it is both accurate and complete.     The note accurately reflects work and decisions made by me.  Aguila Trammell M.D.  2/28/2025  1:37 AM

## 2025-03-01 NOTE — ED PROVIDER NOTES
ER Provider Note    Scribed for  Usman Mccormack D.O. by Terrance Herrera. 2/28/2025   8:29 PM    Primary Care Provider: Pcp Pt States None    CHIEF COMPLAINT  Chief Complaint   Patient presents with    Flu Like Symptoms     Pt brought in by walk in complaints of flu like symptoms, nausea and vomiting,  poor oral intake, epigastric pain. Pt reported she was seen yesterday for the same complaint but no improvement.          EXTERNAL RECORDS REVIEWED  Inpatient Notes The patient was seen at ED yesterday for nausea and vomiting.     HPI/ROS  LIMITATION TO HISTORY   Select: : None  OUTSIDE HISTORIAN(S):  Family was present at bedside.     Julianna Johnson is a 24 y.o. female who presents to the ED for evaluation of flu like symptoms. The patient reports associated symptoms of nausea, vomiting, epigastric pain, and body aches. She was recently seen at ED and was diagnosed with influenza  Reports she is unable to tolerate oral intake. Concern of continuing symptoms the patient presents to the ED for further evaluation.       PAST MEDICAL HISTORY  Past Medical History:   Diagnosis Date    Ovarian cyst        SURGICAL HISTORY  Past Surgical History:   Procedure Laterality Date    [place] EXPLORATORY OF ABDOMEN N/A 6/22/2020    Procedure: MINILAPAROTOMY;  Surgeon: Mary Jesus M.D.;  Location: SURGERY SAME DAY [facility] ORS;  Service: Labor and Delivery    OVARIAN CYSTECTOMY Right 6/22/2020    Procedure: EXCISION, CYST, OVARY;  Surgeon: Mary Jesus M.D.;  Location: SURGERY SAME DAY [facility] ORS;  Service: Labor and Delivery       FAMILY HISTORY  No family history on file.    SOCIAL HISTORY   reports that she has never smoked. She has never used smokeless tobacco. She reports that she does not currently use alcohol. She reports that she does not currently use drugs.    CURRENT MEDICATIONS  Discharge Medication List as of 2/28/2025 10:52 PM        CONTINUE these medications which have NOT  "CHANGED    Details   prochlorperazine (COMPAZINE) 10 MG Tab Take 1 Tablet by mouth every 6 hours as needed for Nausea/Vomiting., Disp-30 Tablet, R-3, Normal      acetaminophen (TYLENOL) 325 MG Tab Take 2 Tablets by mouth every 6 hours as needed for Mild Pain or Fever., Disp-30 Tablet, R-0, Normal      ibuprofen (MOTRIN) 400 MG Tab Take 1 Tablet by mouth every 6 hours as needed for Moderate Pain., Disp-30 Tablet, R-0, Normal      ondansetron (ZOFRAN ODT) 4 MG TABLET DISPERSIBLE Take 1 Tablet by mouth every 6 hours as needed for Nausea/Vomiting., Disp-10 Tablet, R-0, Normal      oseltamivir (TAMIFLU) 75 MG Cap Take 1 Capsule by mouth 2 times a day., Disp-10 Capsule, R-0, Normal      benzonatate (TESSALON) 100 MG Cap Take 1 Capsule by mouth 3 times a day as needed for Cough., Disp-30 Capsule, R-0, Normal             ALLERGIES  No Known Allergies     PHYSICAL EXAM  /62   Pulse 86   Temp 36.1 °C (96.9 °F) (Temporal)   Resp 16   Ht 1.65 m (5' 4.96\")   Wt 100 kg (220 lb 7.4 oz)   LMP 02/25/2025 (Exact Date)   SpO2 94%   BMI 36.73 kg/m²    General: No acute distress, obese  Neuro: GCS 15, awake alert and oriented x 4, cranial nerves II through gross intact muscle strength 5 out of 5 no fortunately sensation coordination and gait within normal limits.  Neck: Supple, no meningismus  Eyes nose throat: Uvula midline without edema there are some tonsillar hypertrophy bilaterally without exudate the floor of the mouth is soft, there is positive for rhinorrhea and congestion, normal TMs.  Speaks in a normal voice, no trismus no stridor.  Cardiac: Regular rate and rhythm  Pulmonary: Clear to auscultation bilaterally no distress  Abdomen: Soft nontender nondistended  Back: Nontender  Psych: Normal  Skin: Pink warm dry  Extremities: Full range of motion, muscle strength sensation intact 2+ pulses    DIAGNOSTIC STUDIES/PROCEDURES  Radiology:  DX-CHEST-PORTABLE (1 VIEW)   Final Result      Hypoinflation without other " evidence for acute cardiopulmonary disease.        Physician interpretation: No acute disease.  Labs:   Labs Reviewed   COMP METABOLIC PANEL - Abnormal; Notable for the following components:       Result Value    Potassium 3.5 (*)     Glucose 124 (*)     Globulin 3.7 (*)     All other components within normal limits   URINALYSIS,CULTURE IF INDICATED - Abnormal; Notable for the following components:    Protein 30 (*)     Occult Blood Small (*)     All other components within normal limits   URINE MICROSCOPIC (W/UA) - Abnormal; Notable for the following components:    RBC 3-5 (*)     Bacteria Rare (*)     Epithelial Cells 6-10 (*)     All other components within normal limits   LIPASE   HCG QUAL SERUM   ESTIMATED GFR     I have independently interpreted the above labs       COURSE & MEDICAL DECISION MAKING     Hydration: Based on the patient's presentation of Dehydration the patient was given IV fluids. IV Hydration was used because oral hydration was not adequate alone. Upon recheck following hydration, the patient was improved.    INITIAL ASSESSMENT, COURSE AND PLAN  Differential diagnoses include but not limited to: Influenza, Dehydration, electrolyte derangement.     Care Narrative: 24-year-old female with a positive influenza here for body aches and nausea and general viral illness and myalgias.  Well-appearing nontoxic.    8:29 PM - Patient was first seen and evaluated at bedside. Patient presents to the ED for flu like symptoms with associated nausea, vomiting, epigastric pain, and body aches. Ordered for CMP, Lipase, Hcg qual serum and UA to evaluate. The patient will be medicated with Zofran 4 mg, Pepcid 20 mg, Toradol 15 mg for her symptoms. Patient verbalizes understanding and support with my plan of care.     Feeling much better after therapy.  Unfortunate I think this patient has influenza and is feeling quite unwell.  We provided medications symptomatically.  And we recommended good rest, hydration, and  over-the-counter Tylenol Motrin and follow-up with a primary care.  Patient understands return to emergency department if things worsen.    9:52 PM- Patient understands that the immune system is built to clear this type of infection and that antibiotics will not change the course of this type of infection. I advised copious fluids, rest, fever control and frequent hand washing to avoid spread of the illness. The patient will be discharged and should return if symptoms worsen or if new symptoms arise. The patient understands and agrees to plan.          DISPOSITION AND DISCUSSIONS    I have discussed management of the patient with the following physicians and DIVYA's:  None    Discussion of management with other QHP or appropriate source(s): None     Escalation of care considered, and ultimately not performed: acute inpatient care management, however at this time, the patient is most appropriate for outpatient management.    Barriers to care at this time, including but not limited to: Patient does not have established PCP.     Decision tools and prescription drugs considered including, but not limited to:  None .    The patient will return for new or worsening symptoms and is stable at the time of discharge.    DISPOSITION:  Patient will be discharged home in stable condition.    FOLLOW UP:  [facility], Emergency Dept  [address] [place] [id number] [phone]    As needed, If symptoms worsen      FINAL DIAGNOSIS  1. Influenza    2. Nausea and vomiting, unspecified vomiting type    3. Hypokalemia    4. Obesity (BMI 30-39.9)      Terrance ALBERTO (Nelia), am scribing for, and in the presence of, Usman Mccormack D.O.     Electronically signed by: Terrance Borjas), 2/28/2025    Usman ALBERTO D.O. personally performed the services described in this documentation, as scribed by Terrance Herrera in my presence, and it is both accurate and complete.      The note accurately reflects work and  decisions made by me.  Usman Mccormack D.O.  3/1/2025  12:15 AM

## 2025-03-01 NOTE — DISCHARGE INSTRUCTIONS
It can be quite uncomfortable.  We recommend good rest, hydration, healthy diet.  We recommend you follow-up with your primary care.  If you do not have 1 we have placed an order so that 1 should reach out and be established.  Feel free to return to the emergency department as needed.

## 2025-03-01 NOTE — ED NOTES
Discharge instructions given.  All questions answered.  Pt to follow-up with PCP, return to ER if symptoms worsen as discussed.  Pt verbalized understanding.  All belongings with pt.  Pt ambulatory to lobby.

## 2025-03-01 NOTE — ED TRIAGE NOTES
Chief Complaint   Patient presents with    Flu Like Symptoms     Pt brought in by walk in complaints of flu like symptoms, nausea and vomiting,  poor oral intake, epigastric pain. Pt reported she was seen yesterday for the same complaint but no improvement.          Vitals:    02/28/25 2010   BP: 122/62   Pulse: 86   Resp: 16   Temp: 36.1 °C (96.9 °F)   SpO2: 94%

## 2025-10-22 ENCOUNTER — APPOINTMENT (OUTPATIENT)
Dept: MEDICAL GROUP | Facility: MEDICAL CENTER | Age: 25
End: 2025-10-22
Payer: COMMERCIAL

## (undated) DEVICE — SUCTION INSTRUMENT YANKAUER OPEN TIP W/O VENT (50EA/CA)

## (undated) DEVICE — GRAFT MESH SEPRAFILM PRO PACK - 5/BX CONTAINS 6 3X5 PIECES

## (undated) DEVICE — ELECTRODE DUAL RETURN W/ CORD - (50/PK)

## (undated) DEVICE — MANIFOLD NEPTUNE 1 PORT (20/PK)

## (undated) DEVICE — CLOSURE PRINEO SKIN - (2EA/BX)

## (undated) DEVICE — GOWN WARMING STANDARD FLEX - (30/CA)

## (undated) DEVICE — KIT  I.V. START (100EA/CA)

## (undated) DEVICE — SODIUM CHL IRRIGATION 0.9% 1000ML (12EA/CA)

## (undated) DEVICE — SPONGE GAUZE STER 4X4 8-PL - (2/PK 50PK/BX 12BX/CS)

## (undated) DEVICE — DRESSING NON-ADHERING 8 X 3 - (50/BX)

## (undated) DEVICE — ELECTRODE 850 FOAM ADHESIVE - HYDROGEL RADIOTRNSPRNT (50/PK)

## (undated) DEVICE — TUBING CLEARLINK DUO-VENT - C-FLO (48EA/CA)

## (undated) DEVICE — SUTURE GENERAL

## (undated) DEVICE — CANISTER SUCTION RIGID RED 1500CC (40EA/CA)

## (undated) DEVICE — WATER IRRIGATION STERILE 1000ML (12EA/CA)

## (undated) DEVICE — HEMOSTAT ARISTA PWD 3 GRAM - (5/CA)

## (undated) DEVICE — CATHETER IV 20 GA X 1-1/4 ---SURG.& SDS ONLY--- (50EA/BX)

## (undated) DEVICE — PROTECTOR ULNA NERVE - (36PR/CA)

## (undated) DEVICE — TRAY FOLEY CATHETER STATLOCK 16FR SURESTEP  (10EA/CA)

## (undated) DEVICE — SPONGE GAUZESTER 4 X 4 4PLY - (128PK/CA)

## (undated) DEVICE — SLEEVE VASO CALF MED - (10PR/CA)

## (undated) DEVICE — LACTATED RINGERS INJ 1000 ML - (14EA/CA 60CA/PF)

## (undated) DEVICE — KIT ANESTHESIA W/CIRCUIT & 3/LT BAG W/FILTER (20EA/CA)

## (undated) DEVICE — SET LEADWIRE 5 LEAD BEDSIDE DISPOSABLE ECG (1SET OF 5/EA)

## (undated) DEVICE — CHLORAPREP 26 ML APPLICATOR - ORANGE TINT(25/CA)

## (undated) DEVICE — TUBE CONNECTING SUCTION - CLEAR PLASTIC STERILE 72 IN (50EA/CA)

## (undated) DEVICE — SENSOR SPO2 NEO LNCS ADHESIVE (20/BX) SEE USER NOTES

## (undated) DEVICE — SET EXTENSION WITH 2 PORTS (48EA/CA) ***PART #2C8610 IS A SUBSTITUTE*****

## (undated) DEVICE — TRAY SRGPRP PVP IOD WT PRP - (20/CA)

## (undated) DEVICE — PACK MAJOR BASIN - (2EA/CA)

## (undated) DEVICE — HEAD HOLDER JUNIOR/ADULT

## (undated) DEVICE — MASK ANESTHESIA ADULT  - (100/CA)

## (undated) DEVICE — GOWN SURGEONS X-LARGE - DISP. (30/CA)

## (undated) DEVICE — NEPTUNE 4 PORT MANIFOLD - (20/PK)

## (undated) DEVICE — SPONGE PEANUT - (5/PK 50PK/CA)

## (undated) DEVICE — PAD LAP STERILE 18 X 18 - (5/PK 40PK/CA)

## (undated) DEVICE — SUCTION INSTRUMENT YANKAUER BULBOUS TIP W/O VENT (50EA/CA)

## (undated) DEVICE — CANISTER SUCTION 3000ML MECHANICAL FILTER AUTO SHUTOFF MEDI-VAC NONSTERILE LF DISP  (40EA/CA)

## (undated) DEVICE — RETRACTOR O C SECTION LRY - (5/BX)

## (undated) DEVICE — DRESSING TRANSPARENT FILM TEGADERM 4 X 4.75" (50EA/BX)"